# Patient Record
Sex: MALE | HISPANIC OR LATINO | Employment: UNEMPLOYED | URBAN - METROPOLITAN AREA
[De-identification: names, ages, dates, MRNs, and addresses within clinical notes are randomized per-mention and may not be internally consistent; named-entity substitution may affect disease eponyms.]

---

## 2017-08-17 ENCOUNTER — HOSPITAL ENCOUNTER (INPATIENT)
Facility: MEDICAL CENTER | Age: 47
LOS: 7 days | DRG: 871 | End: 2017-08-24
Attending: HOSPITALIST | Admitting: HOSPITALIST
Payer: COMMERCIAL

## 2017-08-17 ENCOUNTER — APPOINTMENT (OUTPATIENT)
Dept: RADIOLOGY | Facility: MEDICAL CENTER | Age: 47
DRG: 871 | End: 2017-08-17
Attending: HOSPITALIST
Payer: COMMERCIAL

## 2017-08-17 ENCOUNTER — RESOLUTE PROFESSIONAL BILLING HOSPITAL PROF FEE (OUTPATIENT)
Dept: HOSPITALIST | Facility: MEDICAL CENTER | Age: 47
End: 2017-08-17
Payer: COMMERCIAL

## 2017-08-17 ENCOUNTER — HOSPITAL ENCOUNTER (OUTPATIENT)
Facility: MEDICAL CENTER | Age: 47
End: 2017-08-17

## 2017-08-17 PROBLEM — K25.9 GASTRIC ULCER: Status: ACTIVE | Noted: 2017-08-17

## 2017-08-17 PROBLEM — B99.9 IMMUNOCOMPROMISED STATUS ASSOCIATED WITH INFECTION (HCC): Status: ACTIVE | Noted: 2017-08-17

## 2017-08-17 PROBLEM — F12.90 MARIJUANA USE, CONTINUOUS: Status: ACTIVE | Noted: 2017-08-17

## 2017-08-17 PROBLEM — A41.9 SEVERE SEPSIS(995.92): Status: ACTIVE | Noted: 2017-08-17

## 2017-08-17 PROBLEM — G89.29 CHRONIC BACK PAIN: Status: ACTIVE | Noted: 2017-08-17

## 2017-08-17 PROBLEM — D84.81 IMMUNOCOMPROMISED STATUS ASSOCIATED WITH INFECTION (HCC): Status: ACTIVE | Noted: 2017-08-17

## 2017-08-17 PROBLEM — Z87.891 HISTORY OF TOBACCO ABUSE: Status: ACTIVE | Noted: 2017-08-17

## 2017-08-17 PROBLEM — J18.9 RIGHT LOWER LOBE PNEUMONIA: Status: ACTIVE | Noted: 2017-08-17

## 2017-08-17 PROBLEM — R65.20 SEVERE SEPSIS(995.92): Status: ACTIVE | Noted: 2017-08-17

## 2017-08-17 PROBLEM — M54.9 CHRONIC BACK PAIN: Status: ACTIVE | Noted: 2017-08-17

## 2017-08-17 PROBLEM — L40.9 PSORIASIS: Status: ACTIVE | Noted: 2017-08-17

## 2017-08-17 PROBLEM — F10.11 HISTORY OF ALCOHOL ABUSE: Status: ACTIVE | Noted: 2017-08-17

## 2017-08-17 LAB
APPEARANCE UR: ABNORMAL
APTT PPP: 29.9 SEC (ref 24.7–36)
BASE EXCESS BLDA CALC-SCNC: -7 MMOL/L (ref -4–3)
BILIRUB UR QL STRIP.AUTO: NEGATIVE
BODY TEMPERATURE: 36.1 CENTIGRADE
COLOR UR: YELLOW
GLUCOSE UR STRIP.AUTO-MCNC: NEGATIVE MG/DL
HCO3 BLDA-SCNC: 15 MMOL/L (ref 17–25)
INHALED O2 FLOW RATE: 4 L/MIN (ref 2–10)
INR PPP: 1.27 (ref 0.87–1.13)
KETONES UR STRIP.AUTO-MCNC: NEGATIVE MG/DL
LACTATE BLD-SCNC: 6.28 MMOL/L (ref 0.5–2)
LEUKOCYTE ESTERASE UR QL STRIP.AUTO: NEGATIVE
MAGNESIUM SERPL-MCNC: 1.5 MG/DL (ref 1.5–2.5)
MICRO URNS: ABNORMAL
NITRITE UR QL STRIP.AUTO: NEGATIVE
PCO2 BLDA: 24.1 MMHG (ref 26–37)
PCO2 TEMP ADJ BLDA: 23.2 MMHG (ref 26–37)
PH BLDA: 7.42 [PH] (ref 7.4–7.5)
PH TEMP ADJ BLDA: 7.43 [PH] (ref 7.4–7.5)
PH UR STRIP.AUTO: 5 [PH]
PHOSPHATE SERPL-MCNC: 3 MG/DL (ref 2.5–4.5)
PO2 BLDA: 66.2 MMHG (ref 64–87)
PO2 TEMP ADJ BLDA: 62.2 MMHG (ref 64–87)
POTASSIUM SERPL-SCNC: 3.3 MMOL/L (ref 3.6–5.5)
PROT UR QL STRIP: 100 MG/DL
PROTHROMBIN TIME: 15.7 SEC (ref 12–14.6)
RBC UR QL AUTO: ABNORMAL
SAO2 % BLDA: 91.9 % (ref 93–99)
SP GR UR STRIP.AUTO: 1.01
SPECIMEN DRAWN FROM PATIENT: ABNORMAL
TROPONIN I SERPL-MCNC: 0.07 NG/ML (ref 0–0.04)
TSH SERPL DL<=0.005 MIU/L-ACNC: 0.48 UIU/ML (ref 0.35–5.5)

## 2017-08-17 PROCEDURE — 36556 INSERT NON-TUNNEL CV CATH: CPT | Performed by: HOSPITALIST

## 2017-08-17 PROCEDURE — 700111 HCHG RX REV CODE 636 W/ 250 OVERRIDE (IP)

## 2017-08-17 PROCEDURE — 700105 HCHG RX REV CODE 258

## 2017-08-17 PROCEDURE — 82533 TOTAL CORTISOL: CPT

## 2017-08-17 PROCEDURE — 84145 PROCALCITONIN (PCT): CPT

## 2017-08-17 PROCEDURE — 84100 ASSAY OF PHOSPHORUS: CPT

## 2017-08-17 PROCEDURE — 87040 BLOOD CULTURE FOR BACTERIA: CPT | Mod: 91

## 2017-08-17 PROCEDURE — 82803 BLOOD GASES ANY COMBINATION: CPT

## 2017-08-17 PROCEDURE — 81001 URINALYSIS AUTO W/SCOPE: CPT

## 2017-08-17 PROCEDURE — 84132 ASSAY OF SERUM POTASSIUM: CPT

## 2017-08-17 PROCEDURE — 85007 BL SMEAR W/DIFF WBC COUNT: CPT

## 2017-08-17 PROCEDURE — 02HV33Z INSERTION OF INFUSION DEVICE INTO SUPERIOR VENA CAVA, PERCUTANEOUS APPROACH: ICD-10-PCS | Performed by: HOSPITALIST

## 2017-08-17 PROCEDURE — 85610 PROTHROMBIN TIME: CPT

## 2017-08-17 PROCEDURE — 83605 ASSAY OF LACTIC ACID: CPT

## 2017-08-17 PROCEDURE — 84484 ASSAY OF TROPONIN QUANT: CPT

## 2017-08-17 PROCEDURE — 84443 ASSAY THYROID STIM HORMONE: CPT

## 2017-08-17 PROCEDURE — 87086 URINE CULTURE/COLONY COUNT: CPT

## 2017-08-17 PROCEDURE — 770022 HCHG ROOM/CARE - ICU (200)

## 2017-08-17 PROCEDURE — 85730 THROMBOPLASTIN TIME PARTIAL: CPT

## 2017-08-17 PROCEDURE — 700105 HCHG RX REV CODE 258: Performed by: HOSPITALIST

## 2017-08-17 PROCEDURE — 80053 COMPREHEN METABOLIC PANEL: CPT

## 2017-08-17 PROCEDURE — 700101 HCHG RX REV CODE 250: Performed by: HOSPITALIST

## 2017-08-17 PROCEDURE — 99291 CRITICAL CARE FIRST HOUR: CPT | Performed by: HOSPITALIST

## 2017-08-17 PROCEDURE — 36600 WITHDRAWAL OF ARTERIAL BLOOD: CPT

## 2017-08-17 PROCEDURE — 71010 DX-CHEST-PORTABLE (1 VIEW): CPT

## 2017-08-17 PROCEDURE — 83735 ASSAY OF MAGNESIUM: CPT

## 2017-08-17 PROCEDURE — 85027 COMPLETE CBC AUTOMATED: CPT

## 2017-08-17 PROCEDURE — B548ZZA ULTRASONOGRAPHY OF SUPERIOR VENA CAVA, GUIDANCE: ICD-10-PCS | Performed by: HOSPITALIST

## 2017-08-17 RX ORDER — BISACODYL 10 MG
10 SUPPOSITORY, RECTAL RECTAL
Status: DISCONTINUED | OUTPATIENT
Start: 2017-08-17 | End: 2017-08-24 | Stop reason: HOSPADM

## 2017-08-17 RX ORDER — ONDANSETRON 4 MG/1
4 TABLET, ORALLY DISINTEGRATING ORAL EVERY 4 HOURS PRN
Status: DISCONTINUED | OUTPATIENT
Start: 2017-08-17 | End: 2017-08-24 | Stop reason: HOSPADM

## 2017-08-17 RX ORDER — SODIUM CHLORIDE 9 MG/ML
1000 INJECTION, SOLUTION INTRAVENOUS
Status: COMPLETED | OUTPATIENT
Start: 2017-08-17 | End: 2017-08-20

## 2017-08-17 RX ORDER — SODIUM CHLORIDE 9 MG/ML
30 INJECTION, SOLUTION INTRAVENOUS
Status: DISCONTINUED | OUTPATIENT
Start: 2017-08-17 | End: 2017-08-24 | Stop reason: HOSPADM

## 2017-08-17 RX ORDER — SODIUM CHLORIDE 9 MG/ML
INJECTION, SOLUTION INTRAVENOUS CONTINUOUS
Status: DISCONTINUED | OUTPATIENT
Start: 2017-08-17 | End: 2017-08-22

## 2017-08-17 RX ORDER — FOLIC ACID 1 MG/1
1 TABLET ORAL DAILY
Status: DISCONTINUED | OUTPATIENT
Start: 2017-08-18 | End: 2017-08-24 | Stop reason: HOSPADM

## 2017-08-17 RX ORDER — OMEPRAZOLE 20 MG/1
20 CAPSULE, DELAYED RELEASE ORAL DAILY
Status: DISCONTINUED | OUTPATIENT
Start: 2017-08-18 | End: 2017-08-19

## 2017-08-17 RX ORDER — ACETAMINOPHEN 325 MG/1
650 TABLET ORAL EVERY 6 HOURS PRN
Status: DISCONTINUED | OUTPATIENT
Start: 2017-08-17 | End: 2017-08-24 | Stop reason: HOSPADM

## 2017-08-17 RX ORDER — HALOPERIDOL 5 MG/ML
5 INJECTION INTRAMUSCULAR EVERY 4 HOURS PRN
Status: DISCONTINUED | OUTPATIENT
Start: 2017-08-17 | End: 2017-08-24 | Stop reason: HOSPADM

## 2017-08-17 RX ORDER — VASOPRESSIN 20 U/ML
INJECTION PARENTERAL
Status: COMPLETED
Start: 2017-08-17 | End: 2017-08-17

## 2017-08-17 RX ORDER — OMEPRAZOLE 20 MG/1
20 CAPSULE, DELAYED RELEASE ORAL DAILY
COMMUNITY

## 2017-08-17 RX ORDER — POTASSIUM CHLORIDE 14.9 MG/ML
20 INJECTION INTRAVENOUS ONCE
Status: COMPLETED | OUTPATIENT
Start: 2017-08-18 | End: 2017-08-18

## 2017-08-17 RX ORDER — ZOLPIDEM TARTRATE 5 MG/1
5 TABLET ORAL NIGHTLY PRN
COMMUNITY

## 2017-08-17 RX ORDER — FOLIC ACID 1 MG/1
1 TABLET ORAL DAILY
COMMUNITY

## 2017-08-17 RX ORDER — NAPROXEN 500 MG/1
500 TABLET ORAL 2 TIMES DAILY WITH MEALS
COMMUNITY

## 2017-08-17 RX ORDER — HYDROCODONE BITARTRATE AND ACETAMINOPHEN 10; 325 MG/1; MG/1
1-2 TABLET ORAL EVERY 6 HOURS PRN
Status: DISCONTINUED | OUTPATIENT
Start: 2017-08-17 | End: 2017-08-24 | Stop reason: HOSPADM

## 2017-08-17 RX ORDER — PROMETHAZINE HYDROCHLORIDE 25 MG/1
12.5-25 SUPPOSITORY RECTAL EVERY 4 HOURS PRN
Status: DISCONTINUED | OUTPATIENT
Start: 2017-08-17 | End: 2017-08-24 | Stop reason: HOSPADM

## 2017-08-17 RX ORDER — CEFTRIAXONE 1 G/1
1 INJECTION, POWDER, FOR SOLUTION INTRAMUSCULAR; INTRAVENOUS
Status: DISCONTINUED | OUTPATIENT
Start: 2017-08-17 | End: 2017-08-17

## 2017-08-17 RX ORDER — POLYETHYLENE GLYCOL 3350 17 G/17G
1 POWDER, FOR SOLUTION ORAL
Status: DISCONTINUED | OUTPATIENT
Start: 2017-08-17 | End: 2017-08-24 | Stop reason: HOSPADM

## 2017-08-17 RX ORDER — SODIUM CHLORIDE 9 MG/ML
2000 INJECTION, SOLUTION INTRAVENOUS ONCE
Status: COMPLETED | OUTPATIENT
Start: 2017-08-17 | End: 2017-08-17

## 2017-08-17 RX ORDER — MORPHINE SULFATE 15 MG/1
30 TABLET, FILM COATED, EXTENDED RELEASE ORAL EVERY 12 HOURS
COMMUNITY

## 2017-08-17 RX ORDER — PROMETHAZINE HYDROCHLORIDE 25 MG/1
12.5-25 TABLET ORAL EVERY 4 HOURS PRN
Status: DISCONTINUED | OUTPATIENT
Start: 2017-08-17 | End: 2017-08-24 | Stop reason: HOSPADM

## 2017-08-17 RX ORDER — HYDROCODONE BITARTRATE AND ACETAMINOPHEN 10; 325 MG/1; MG/1
1-2 TABLET ORAL EVERY 6 HOURS PRN
COMMUNITY

## 2017-08-17 RX ORDER — ONDANSETRON 2 MG/ML
4 INJECTION INTRAMUSCULAR; INTRAVENOUS EVERY 4 HOURS PRN
Status: DISCONTINUED | OUTPATIENT
Start: 2017-08-17 | End: 2017-08-24 | Stop reason: HOSPADM

## 2017-08-17 RX ORDER — AMOXICILLIN 250 MG
2 CAPSULE ORAL 2 TIMES DAILY
Status: DISCONTINUED | OUTPATIENT
Start: 2017-08-17 | End: 2017-08-24 | Stop reason: HOSPADM

## 2017-08-17 RX ORDER — MAGNESIUM SULFATE 1 G/100ML
1 INJECTION INTRAVENOUS ONCE
Status: COMPLETED | OUTPATIENT
Start: 2017-08-18 | End: 2017-08-18

## 2017-08-17 RX ADMIN — VASOPRESSIN 20 UNITS: 20 INJECTION INTRAVENOUS at 21:26

## 2017-08-17 RX ADMIN — CEFTRIAXONE 2 G: 1 INJECTION, POWDER, FOR SOLUTION INTRAMUSCULAR; INTRAVENOUS at 22:40

## 2017-08-17 RX ADMIN — SODIUM CHLORIDE: 9 INJECTION, SOLUTION INTRAVENOUS at 21:50

## 2017-08-17 RX ADMIN — NOREPINEPHRINE BITARTRATE 20 MCG/MIN: 1 INJECTION, SOLUTION, CONCENTRATE INTRAVENOUS at 21:03

## 2017-08-17 RX ADMIN — VANCOMYCIN HYDROCHLORIDE 1300 MG: 10 INJECTION, POWDER, LYOPHILIZED, FOR SOLUTION INTRAVENOUS at 23:15

## 2017-08-17 RX ADMIN — SODIUM CHLORIDE 2000 ML: 9 INJECTION, SOLUTION INTRAVENOUS at 22:21

## 2017-08-17 RX ADMIN — SODIUM CHLORIDE: 9 INJECTION, SOLUTION INTRAVENOUS at 23:36

## 2017-08-17 ASSESSMENT — PAIN SCALES - GENERAL: PAINLEVEL_OUTOF10: 6

## 2017-08-17 ASSESSMENT — LIFESTYLE VARIABLES: EVER_SMOKED: YES

## 2017-08-17 NOTE — IP AVS SNAPSHOT
8/24/2017    Layo Fiscel  [address]  [facility] [address]    Dear Layo:    [facility] wants to ensure your discharge home is safe and you or your loved ones have had all of your questions answered regarding your care after you leave the hospital.    Below is a list of resources and contact information should you have any questions regarding your hospital stay, follow-up instructions, or active medical symptoms.    Questions or Concerns Regarding… Contact   Medical Questions Related to Your Discharge  (7 days a week, 8am-5pm) Contact a Nurse Care Coordinator   [phone]   Medical Questions Not Related to Your Discharge  (24 hours a day / 7 days a week)  Contact the Nurse Health Line   [phone]    Medications or Discharge Instructions Refer to your discharge packet   or contact your [facility] Primary Care Provider   [phone]   Follow-up Appointment(s) Schedule your appointment via [employer]   or contact Scheduling [phone]   Billing Review your statement via [employer]  or contact Billing [phone]   Medical Records Review your records via [employer]   or contact Medical Records [phone]     You may receive a telephone call within two days of discharge. This call is to make certain you understand your discharge instructions and have the opportunity to have any questions answered. You can also easily access your medical information, test results and upcoming appointments via the [employer]Force free online health management tool. You can learn more and sign up at [employer]. For assistance setting up your MagForce account, please call [phone].    Once again, we want to ensure your discharge home is safe and that you have a clear understanding of any next steps in your care. If you have any questions or concerns, please do not hesitate to contact us, we are here for you. Thank you for choosing [facility] for your healthcare needs.    Sincerely,    Your [facility] Healthcare Team

## 2017-08-17 NOTE — IP AVS SNAPSHOT
" <p align=\"LEFT\"><IMG SRC=\"[email or web address]\" alt=\"Image\" WIDTH=\"50%\" HEIGHT=\"200\" BORDER=\"\"></p>                   Name:Layo Garcia  Medical Record Number:[id number]  CSN: [id number]    YOB: 1970   Age: 46 y.o.  Sex: male  HT:1.727 m (5' 8\") WT: 98 kg (216 lb 0.8 oz)          Admit Date: 8/17/2017     Discharge Date:   Today's Date: 8/24/2017  Attending Doctor:  Perla Lainez M.D.                  Allergies:  Wellbutrin and Cortisone             Medication List      Take these Medications        Instructions    amoxicillin-clavulanate 875-125 MG Tabs   Commonly known as:  AUGMENTIN    Take 1 Tab by mouth every 12 hours for 10 days.   Dose:  1 Tab       fluconazole 200 MG Tabs   Commonly known as:  DIFLUCAN    Take 1 Tab by mouth every day for 5 days.   Dose:  200 mg       folic acid 1 MG Tabs   Commonly known as:  FOLVITE    Take 1 mg by mouth every day.   Dose:  1 mg       hydrocodone/acetaminophen  MG Tabs   Commonly known as:  NORCO    Take 1-2 Tabs by mouth every 6 hours as needed for Moderate Pain.   Dose:  1-2 Tab       methotrexate 2.5 MG Tabs    Take 15 mg by mouth every 7 days.   Dose:  15 mg       morphine ER 15 MG Tbcr tablet   Commonly known as:  MS CONTIN    Take 30 mg by mouth every 12 hours.   Dose:  30 mg       naproxen 500 MG Tabs   Commonly known as:  NAPROSYN    Take 500 mg by mouth 2 times a day, with meals.   Dose:  500 mg       omeprazole 20 MG delayed-release capsule   Commonly known as:  PRILOSEC    Take 20 mg by mouth every day.   Dose:  20 mg       zolpidem 5 MG Tabs   Commonly known as:  AMBIEN    Take 5 mg by mouth at bedtime as needed for Sleep.   Dose:  5 mg         "

## 2017-08-17 NOTE — IP AVS SNAPSHOT
" Home Care Instructions                                                                                                                  Name:Layo Garcia  Medical Record Number:[id number]  CSN: [id number]    YOB: 1970   Age: 46 y.o.  Sex: male  HT:1.727 m (5' 8\") WT: 98 kg (216 lb 0.8 oz)          Admit Date: 8/17/2017     Discharge Date:   Today's Date: 8/24/2017  Attending Doctor:  Perla Lainez M.D.                  Allergies:  Wellbutrin and Cortisone            Discharge Instructions       Discharge Instructions    Discharged to home by car with relative. Discharged via wheelchair, hospital escort: Yes.  Special equipment needed: Not Applicable    Be sure to schedule a follow-up appointment with your primary care doctor or any specialists as instructed.     Discharge Plan:        I understand that a diet low in cholesterol, fat, and sodium is recommended for good health. Unless I have been given specific instructions below for another diet, I accept this instruction as my diet prescription.   Other diet: as tolerated.    Special Instructions: Follow up with PCP.    · Is patient discharged on Warfarin / Coumadin?   No     · Is patient Post Blood Transfusion?  No    Depression / Suicide Risk    As you are discharged from this [facility] facility, it is important to learn how to keep safe from harming yourself.    Recognize the warning signs:  · Abrupt changes in personality, positive or negative- including increase in energy   · Giving away possessions  · Change in eating patterns- significant weight changes-  positive or negative  · Change in sleeping patterns- unable to sleep or sleeping all the time   · Unwillingness or inability to communicate  · Depression  · Unusual sadness, discouragement and loneliness  · Talk of wanting to die  · Neglect of personal appearance   · Rebelliousness- reckless behavior  · Withdrawal from people/activities they love  · Confusion- inability to concentrate     If " you or a loved one observes any of these behaviors or has concerns about self-harm, here's what you can do:  · Talk about it- your feelings and reasons for harming yourself  · Remove any means that you might use to hurt yourself (examples: pills, rope, extension cords, firearm)  · Get professional help from the community (Mental Health, Substance Abuse, psychological counseling)  · Do not be alone:Call your Safe Contact- someone whom you trust who will be there for you.  · Call your local CRISIS HOTLINE 896-1813 or 700-712-1379  · Call your local Children's Mobile Crisis Response Team [place] (210) 554-5205 or www.PlayWith  · Call the toll free National Suicide Prevention Hotlines   · National Suicide Prevention Lifeline 755-636-HUVN (3335)  · National [employer] Line Network 800-SUICIDE (977-2583)      Sepsis, Adult  Sepsis is a serious infection of your blood or tissues that affects your whole body. The infection that causes sepsis may be bacterial, viral, fungal, or parasitic. Sepsis may be life threatening. Sepsis can cause your blood pressure to drop. This may result in shock. Shock causes your central nervous system and your organs to stop working correctly.   RISK FACTORS  Sepsis can happen in anyone, but it is more likely to happen in people who have weakened immune systems.  SIGNS AND SYMPTOMS   Symptoms of sepsis can include:  · Fever or low body temperature (hypothermia).  · Rapid breathing (hyperventilation).  · Chills.  · Rapid heartbeat (tachycardia).  · Confusion or light-headedness.  · Trouble breathing.  · Urinating much less than usual.  · Cool, clammy skin or red, flushed skin.  · Other problems with the heart, kidneys, or brain.  DIAGNOSIS   Your health care provider will likely do tests to look for an infection, to see if the infection has spread to your blood, and to see how serious your condition is. Tests can include:  · Blood tests, including cultures of your blood.  · Cultures of  other fluids from your body, such as:  ¨ Urine.  ¨ Pus from wounds.  ¨ Mucus coughed up from your lungs.  · Urine tests other than cultures.  · X-ray exams or other imaging tests.  TREATMENT   Treatment will begin with elimination of the source of infection. If your sepsis is likely caused by a bacterial or fungal infection, you will be given antibiotic or antifungal medicines.  You may also receive:  · Oxygen.  · Fluids through an IV tube.  · Medicines to increase your blood pressure.  · A machine to clean your blood (dialysis) if your kidneys fail.  · A machine to help you breathe if your lungs fail.  SEEK IMMEDIATE MEDICAL CARE IF:  You get an infection or develop any of the signs and symptoms of sepsis after surgery or a hospitalization.     This information is not intended to replace advice given to you by your health care provider. Make sure you discuss any questions you have with your health care provider.     Document Released: 09/15/2004 Document Revised: 05/03/2016 Document Reviewed: 08/25/2014  [employer] Interactive Patient Education ©2016 Elsevier Inc.    Amoxicillin; Clavulanic Acid tablets  What is this medicine?  AMOXICILLIN; CLAVULANIC ACID (a mox i GERTRUDE in; SANDRO mosley ic AS id) is a penicillin antibiotic. It is used to treat certain kinds of bacterial infections. It will not work for colds, flu, or other viral infections.  This medicine may be used for other purposes; ask your health care provider or pharmacist if you have questions.  COMMON BRAND NAME(S): Augmentin  What should I tell my health care provider before I take this medicine?  They need to know if you have any of these conditions:  -bowel disease, like colitis  -kidney disease  -liver disease  -mononucleosis  -an unusual or allergic reaction to amoxicillin, penicillin, cephalosporin, other antibiotics, clavulanic acid, other medicines, foods, dyes, or preservatives  -pregnant or trying to get pregnant  -breast-feeding  How should I use  this medicine?  Take this medicine by mouth with a full glass of water. Follow the directions on the prescription label. Take at the start of a meal. Do not crush or chew. If the tablet has a score line, you may cut it in half at the score line for easier swallowing. Take your medicine at regular intervals. Do not take your medicine more often than directed. Take all of your medicine as directed even if you think you are better. Do not skip doses or stop your medicine early.  Talk to your pediatrician regarding the use of this medicine in children. Special care may be needed.  Overdosage: If you think you have taken too much of this medicine contact a poison control center or emergency room at once.  NOTE: This medicine is only for you. Do not share this medicine with others.  What if I miss a dose?  If you miss a dose, take it as soon as you can. If it is almost time for your next dose, take only that dose. Do not take double or extra doses.  What may interact with this medicine?  -allopurinol  -anticoagulants  -birth control pills  -methotrexate  -probenecid  This list may not describe all possible interactions. Give your health care provider a list of all the medicines, herbs, non-prescription drugs, or dietary supplements you use. Also tell them if you smoke, drink alcohol, or use illegal drugs. Some items may interact with your medicine.  What should I watch for while using this medicine?  Tell your doctor or health care professional if your symptoms do not improve.  Do not treat diarrhea with over the counter products. Contact your doctor if you have diarrhea that lasts more than 2 days or if it is severe and watery.  If you have diabetes, you may get a false-positive result for sugar in your urine. Check with your doctor or health care professional.  Birth control pills may not work properly while you are taking this medicine. Talk to your doctor about using an extra method of birth control.  What side  effects may I notice from receiving this medicine?  Side effects that you should report to your doctor or health care professional as soon as possible:  -allergic reactions like skin rash, itching or hives, swelling of the face, lips, or tongue  -breathing problems  -dark urine  -fever or chills, sore throat  -redness, blistering, peeling or loosening of the skin, including inside the mouth  -seizures  -trouble passing urine or change in the amount of urine  -unusual bleeding, bruising  -unusually weak or tired  -white patches or sores in the mouth or throat  Side effects that usually do not require medical attention (report to your doctor or health care professional if they continue or are bothersome):  -diarrhea  -dizziness  -headache  -nausea, vomiting  -stomach upset  -vaginal or anal irritation  This list may not describe all possible side effects. Call your doctor for medical advice about side effects. You may report side effects to FDA at 0-273-FDA-5291.  Where should I keep my medicine?  Keep out of the reach of children.  Store at room temperature below 25 degrees C (77 degrees F). Keep container tightly closed. Throw away any unused medicine after the expiration date.  NOTE: This sheet is a summary. It may not cover all possible information. If you have questions about this medicine, talk to your doctor, pharmacist, or health care provider.  © 2014, Elsevier/Gold Standard. (3/12/2009 12:04:30 PM)    Fluconazole tablets  What is this medicine?  FLUCONAZOLE (floo SHIRAZ na zole) is an antifungal medicine. It is used to treat certain kinds of fungal or yeast infections.  This medicine may be used for other purposes; ask your health care provider or pharmacist if you have questions.  COMMON BRAND NAME(S): Diflucan  What should I tell my health care provider before I take this medicine?  They need to know if you have any of these conditions:  -electrolyte abnormalities  -history of irregular heart beat  -kidney  disease  -an unusual or allergic reaction to fluconazole, other azole antifungals, medicines, foods, dyes, or preservatives  -pregnant or trying to get pregnant  -breast-feeding  How should I use this medicine?  Take this medicine by mouth. Follow the directions on the prescription label. Do not take your medicine more often than directed.  Talk to your pediatrician regarding the use of this medicine in children. Special care may be needed. This medicine has been used in children as young as 6 months of age.  Overdosage: If you think you have taken too much of this medicine contact a poison control center or emergency room at once.  NOTE: This medicine is only for you. Do not share this medicine with others.  What if I miss a dose?  If you miss a dose, take it as soon as you can. If it is almost time for your next dose, take only that dose. Do not take double or extra doses.  What may interact with this medicine?  Do not take this medicine with any of the following medications:  -cisapride  -pimozide  -red yeast rice  This medicine may also interact with the following medications:  -birth control pills  -cyclosporine  -diuretics like hydrochlorothiazide  -medicines for diabetes that are taken by mouth  -medicines for high cholesterol like atorvastatin, lovastatin or simvastatin  -phenytoin  -ramelteon  -rifabutin  -rifampin  -some medicines for anxiety or sleep  -tacrolimus  -terfenadine  -theophylline  -tofacitinib  -warfarin  This list may not describe all possible interactions. Give your health care provider a list of all the medicines, herbs, non-prescription drugs, or dietary supplements you use. Also tell them if you smoke, drink alcohol, or use illegal drugs. Some items may interact with your medicine.  What should I watch for while using this medicine?  Visit your doctor or health care professional for regular checkups. If you are taking this medicine for a long time you may need blood work. Tell your doctor  if your symptoms do not improve. Some fungal infections need many weeks or months of treatment to cure.  Alcohol can increase possible damage to your liver. Avoid alcoholic drinks.  If you have a vaginal infection, do not have sex until you have finished your treatment. You can wear a sanitary napkin. Do not use tampons. Wear freshly washed cotton, not synthetic, panties.  What side effects may I notice from receiving this medicine?  Side effects that you should report to your doctor or health care professional as soon as possible:  -allergic reactions like skin rash or itching, hives, swelling of the lips, mouth, tongue, or throat  -dark urine  -feeling dizzy or faint  -irregular heartbeat or chest pain  -redness, blistering, peeling or loosening of the skin, including inside the mouth  -trouble breathing  -unusual bruising or bleeding  -vomiting  -yellowing of the eyes or skin  Side effects that usually do not require medical attention (report to your doctor or health care professional if they continue or are bothersome):  -changes in how food tastes  -diarrhea  -headache  -stomach upset or nausea  This list may not describe all possible side effects. Call your doctor for medical advice about side effects. You may report side effects to FDA at 7-719-FDA-0805.  Where should I keep my medicine?  Keep out of the reach of children.  Store at room temperature below 30 degrees C (86 degrees F). Throw away any medicine after the expiration date.  NOTE: This sheet is a summary. It may not cover all possible information. If you have questions about this medicine, talk to your doctor, pharmacist, or health care provider.  © 2014, Elsevier/Gold Standard. (9/17/2013 3:15:11 PM)         Discharge Medication Instructions:    Below are the medications your physician expects you to take upon discharge:    Review all your home medications and newly ordered medications with your doctor and/or pharmacist. Follow medication  instructions as directed by your doctor and/or pharmacist.    Please keep your medication list with you and share with your physician.               Medication List      START taking these medications        Instructions    Morning Afternoon Evening Bedtime    amoxicillin-clavulanate 875-125 MG Tabs   Last time this was given:  1 Tab on 8/24/2017  8:58 AM   Commonly known as:  AUGMENTIN        Take 1 Tab by mouth every 12 hours for 10 days.   Dose:  1 Tab                        fluconazole 200 MG Tabs   Last time this was given:  200 mg on 8/24/2017  8:58 AM   Commonly known as:  DIFLUCAN        Take 1 Tab by mouth every day for 5 days.   Dose:  200 mg                          CONTINUE taking these medications        Instructions    Morning Afternoon Evening Bedtime    folic acid 1 MG Tabs   Last time this was given:  1 mg on 8/24/2017  9:00 AM   Commonly known as:  FOLVITE        Take 1 mg by mouth every day.   Dose:  1 mg                        hydrocodone/acetaminophen  MG Tabs   Last time this was given:  2 Tabs on 8/24/2017 12:59 AM   Commonly known as:  NORCO        Take 1-2 Tabs by mouth every 6 hours as needed for Moderate Pain.   Dose:  1-2 Tab                        methotrexate 2.5 MG Tabs        Take 15 mg by mouth every 7 days.   Dose:  15 mg                        morphine ER 15 MG Tbcr tablet   Last time this was given:  30 mg on 8/24/2017  8:58 AM   Commonly known as:  MS CONTIN        Take 30 mg by mouth every 12 hours.   Dose:  30 mg                        naproxen 500 MG Tabs   Commonly known as:  NAPROSYN        Take 500 mg by mouth 2 times a day, with meals.   Dose:  500 mg                        omeprazole 20 MG delayed-release capsule   Last time this was given:  20 mg on 8/24/2017  8:57 AM   Commonly known as:  PRILOSEC        Take 20 mg by mouth every day.   Dose:  20 mg                        zolpidem 5 MG Tabs   Commonly known as:  AMBIEN        Take 5 mg by mouth at bedtime as  needed for Sleep.   Dose:  5 mg                             Where to Get Your Medications      You can get these medications from any pharmacy     Bring a paper prescription for each of these medications    - amoxicillin-clavulanate 875-125 MG Tabs  - fluconazole 200 MG Tabs            Instructions           Diet / Nutrition:    Follow any diet instructions given to you by your doctor or the dietician, including how much salt (sodium) you are allowed each day.    If you are overweight, talk to your doctor about a weight reduction plan.    Activity:    Remain physically active following your doctor's instructions about exercise and activity.    Rest often.     Any time you become even a little tired or short of breath, SIT DOWN and rest.    Worsening Symptoms:    Report any of the following signs and symptoms to the doctor's office immediately:    *Pain of jaw, arm, or neck  *Chest pain not relieved by medication                               *Dizziness or loss of consciousness  *Difficulty breathing even when at rest   *More tired than usual                                       *Bleeding drainage or swelling of surgical site  *Swelling of feet, ankles, legs or stomach                 *Fever (>100ºF)  *Pink or blood tinged sputum  *Weight gain (3lbs/day or 5lbs /week)           *Shock from internal defibrillator (if applicable)  *Palpitations or irregular heartbeats                *Cool and/or numb extremities    Stroke Awareness    Common Risk Factors for Stroke include:    Age  Atrial Fibrillation  Carotid Artery Stenosis  Diabetes Mellitus  Excessive alcohol consumption  High blood pressure  Overweight   Physical inactivity  Smoking    Warning signs and symptoms of a stroke include:    *Sudden numbness or weakness of the face, arm or leg (especially on one side of the body).  *Sudden confusion, trouble speaking or understanding.  *Sudden trouble seeing in one or both eyes.  *Sudden trouble walking, dizziness,  loss of balance or coordination.Sudden severe headache with no known cause.    It is very important to get treatment quickly when a stroke occurs. If you experience any of the above warning signs, call 911 immediately.                   Disclaimer         Quit Smoking / Tobacco Use:    I understand the use of any tobacco products increases my chance of suffering from future heart disease or stroke and could cause other illnesses which may shorten my life. Quitting the use of tobacco products is the single most important thing I can do to improve my health. For further information on smoking / tobacco cessation call a Toll Free Quit Line at 1-764.827.7891 (*National [employer] [place]) or 1-427.273.8348 (American Lung Association) or you can access the web based program at www.lungVoipSwitch.org.    [place] Tobacco Users Help Line:  (188) 516-2217       Toll Free: 1-295.219.2765  Quit Tobacco Program [facility] (426)417-7928    Crisis Hotline:    [place] Crisis Hotline:  9-119-NNQAALK or 1-489.949.6862    [place] Crisis Hotline:    1-481.344.4414 or 316-023-2925    Discharge Survey:   Thank you for choosing [facility]. We hope we did everything we could to make your hospital stay a pleasant one. You may be receiving a phone survey and we would appreciate your time and participation in answering the questions. Your input is very valuable to us in our efforts to improve our service to our patients and their families.        My signature on this form indicates that:    1. I have reviewed and understand the above information.  2. My questions regarding this information have been answered to my satisfaction.  3. I have formulated a plan with my discharge nurse to obtain my prescribed medications for home.                  Disclaimer         __________________________________                     __________       ________                       Patient Signature                                                  Date                    Time

## 2017-08-17 NOTE — IP AVS SNAPSHOT
CTSpace Access Code: [email or web address]  Expires: 9/23/2017 11:22 AM    Your email address is not on file at [facility].  Email Addresses are required for you to sign up for CTSpace, please contact [phone] to verify your personal information and to provide your email address prior to attempting to register for CTSpace.    Layo Garcia   [id number]  [place] [address]    CTSpace  A secure, online tool to manage your health information     [facility]’s CTSpace® is a secure, online tool that connects you to your personalized health information from the privacy of your home -- day or night - making it very easy for you to manage your healthcare. Once the activation process is completed, you can even access your medical information using the CTSpace baltazar, which is available for free in the Apple Baltazar store or Google Play store.     To learn more about CTSpace, visit [email or web address]    There are two levels of access available (as shown below):   My Chart Features  [facility] Primary Care Doctor [facility]  Specialists [facility]  Urgent  Care Non-[facility] Primary Care Doctor   Email your healthcare team securely and privately 24/7 X X X    Manage appointments: schedule your next appointment; view details of past/upcoming appointments X      Request prescription refills. X      View recent personal medical records, including lab and immunizations X X X X   View health record, including health history, allergies, medications X X X X   Read reports about your outpatient visits, procedures, consult and ER notes X X X X   See your discharge summary, which is a recap of your hospital and/or ER visit that includes your diagnosis, lab results, and care plan X X  X     How to register for [employer]:  Once your e-mail address has been verified, follow the following steps to sign up for CTSpace.     1. Go to  [email or web address]  2. Click on the Sign Up Now box, which takes you to the New Member Sign Up page. You will need to  provide the following information:  a. Enter your [employer] Access Code exactly as it appears at the top of this page. (You will not need to use this code after you’ve completed the sign-up process. If you do not sign up before the expiration date, you must request a new code.)   b. Enter your date of birth.   c. Enter your home email address.   d. Click Submit, and follow the next screen’s instructions.  3. Create a [employer]ics ID. This will be your [employer] login ID and cannot be changed, so think of one that is secure and easy to remember.  4. Create a [employer] password. You can change your password at any time.  5. Enter your Password Reset Question and Answer. This can be used at a later time if you forget your password.   6. Enter your e-mail address. This allows you to receive e-mail notifications when new information is available in [employer].  7. Click Sign Up. You can now view your health information.    For assistance activating your [employer] account, call [phone]

## 2017-08-18 ENCOUNTER — APPOINTMENT (OUTPATIENT)
Dept: RADIOLOGY | Facility: MEDICAL CENTER | Age: 47
DRG: 871 | End: 2017-08-18
Attending: HOSPITALIST
Payer: COMMERCIAL

## 2017-08-18 ENCOUNTER — APPOINTMENT (OUTPATIENT)
Dept: RADIOLOGY | Facility: MEDICAL CENTER | Age: 47
DRG: 871 | End: 2017-08-18
Attending: INTERNAL MEDICINE
Payer: COMMERCIAL

## 2017-08-18 PROBLEM — R65.21 SEPTIC SHOCK(785.52): Status: ACTIVE | Noted: 2017-08-17

## 2017-08-18 PROBLEM — R79.89 ELEVATED TROPONIN LEVEL: Status: ACTIVE | Noted: 2017-08-18

## 2017-08-18 PROBLEM — N17.9 ACUTE RENAL FAILURE (ARF) (HCC): Status: ACTIVE | Noted: 2017-08-18

## 2017-08-18 PROBLEM — E87.6 HYPOKALEMIA: Status: ACTIVE | Noted: 2017-08-18

## 2017-08-18 PROBLEM — E87.1 HYPONATREMIA: Status: ACTIVE | Noted: 2017-08-18

## 2017-08-18 PROBLEM — J96.01 ACUTE RESPIRATORY FAILURE WITH HYPOXIA (HCC): Status: ACTIVE | Noted: 2017-08-18

## 2017-08-18 LAB
ALBUMIN SERPL BCP-MCNC: 2.9 G/DL (ref 3.2–4.9)
ALBUMIN/GLOB SERPL: 0.7 G/DL
ALP SERPL-CCNC: 78 U/L (ref 30–99)
ALT SERPL-CCNC: 22 U/L (ref 2–50)
ANION GAP SERPL CALC-SCNC: 15 MMOL/L (ref 0–11.9)
ANION GAP SERPL CALC-SCNC: 16 MMOL/L (ref 0–11.9)
AST SERPL-CCNC: 45 U/L (ref 12–45)
BASOPHILS # BLD AUTO: 0 % (ref 0–1.8)
BASOPHILS # BLD: 0 K/UL (ref 0–0.12)
BILIRUB SERPL-MCNC: 1.6 MG/DL (ref 0.1–1.5)
BUN SERPL-MCNC: 22 MG/DL (ref 8–22)
BUN SERPL-MCNC: 27 MG/DL (ref 8–22)
C DIFF DNA SPEC QL NAA+PROBE: NEGATIVE
C DIFF TOX GENS STL QL NAA+PROBE: NEGATIVE
CALCIUM SERPL-MCNC: 7.4 MG/DL (ref 8.4–10.2)
CALCIUM SERPL-MCNC: 7.4 MG/DL (ref 8.4–10.2)
CASTS 1761B: ABNORMAL /LPF
CASTS URNS QL MICRO: ABNORMAL /LPF
CHLORIDE SERPL-SCNC: 104 MMOL/L (ref 96–112)
CHLORIDE SERPL-SCNC: 111 MMOL/L (ref 96–112)
CO2 SERPL-SCNC: 13 MMOL/L (ref 20–33)
CO2 SERPL-SCNC: 14 MMOL/L (ref 20–33)
CORTIS SERPL-MCNC: 18.7 UG/DL (ref 0–23)
CREAT SERPL-MCNC: 2.05 MG/DL (ref 0.5–1.4)
CREAT SERPL-MCNC: 2.45 MG/DL (ref 0.5–1.4)
DOHLE BOD BLD QL SMEAR: NORMAL
EKG IMPRESSION: NORMAL
EOSINOPHIL # BLD AUTO: 0 K/UL (ref 0–0.51)
EOSINOPHIL NFR BLD: 0 % (ref 0–6.9)
ERYTHROCYTE [DISTWIDTH] IN BLOOD BY AUTOMATED COUNT: 47.7 FL (ref 35.9–50)
ERYTHROCYTE [DISTWIDTH] IN BLOOD BY AUTOMATED COUNT: 49.1 FL (ref 35.9–50)
GFR SERPL CREATININE-BSD FRML MDRD: 28 ML/MIN/1.73 M 2
GFR SERPL CREATININE-BSD FRML MDRD: 35 ML/MIN/1.73 M 2
GLOBULIN SER CALC-MCNC: 4.3 G/DL (ref 1.9–3.5)
GLUCOSE SERPL-MCNC: 111 MG/DL (ref 65–99)
GLUCOSE SERPL-MCNC: 96 MG/DL (ref 65–99)
HCT VFR BLD AUTO: 41.2 % (ref 42–52)
HCT VFR BLD AUTO: 45 % (ref 42–52)
HGB BLD-MCNC: 13.7 G/DL (ref 14–18)
HGB BLD-MCNC: 15.1 G/DL (ref 14–18)
LACTATE BLD-SCNC: 2.93 MMOL/L (ref 0.5–2)
LACTATE BLD-SCNC: 3.37 MMOL/L (ref 0.5–2)
LACTATE BLD-SCNC: 6.32 MMOL/L (ref 0.5–2)
LACTATE BLD-SCNC: 7.6 MMOL/L (ref 0.5–2)
LACTATE BLD-SCNC: 7.91 MMOL/L (ref 0.5–2)
LV EJECT FRACT  99904: 45
LV EJECT FRACT MOD 2C 99903: 55.13
LV EJECT FRACT MOD 4C 99902: 42.68
LV EJECT FRACT MOD BP 99901: 46.82
LYMPHOCYTES # BLD AUTO: 3.77 K/UL (ref 1–4.8)
LYMPHOCYTES NFR BLD: 10 % (ref 22–41)
MANUAL DIFF BLD: ABNORMAL
MCH RBC QN AUTO: 30.2 PG (ref 27–33)
MCH RBC QN AUTO: 30.3 PG (ref 27–33)
MCHC RBC AUTO-ENTMCNC: 33.3 G/DL (ref 33.7–35.3)
MCHC RBC AUTO-ENTMCNC: 33.6 G/DL (ref 33.7–35.3)
MCV RBC AUTO: 90.4 FL (ref 81.4–97.8)
MCV RBC AUTO: 90.7 FL (ref 81.4–97.8)
METAMYELOCYTES NFR BLD MANUAL: 5 %
MONOCYTES # BLD AUTO: 0.38 K/UL (ref 0–0.85)
MONOCYTES NFR BLD AUTO: 1 % (ref 0–13.4)
MRSA DNA SPEC QL NAA+PROBE: NORMAL
MUCOUS THREADS #/AREA URNS HPF: ABNORMAL /HPF
NEUTROPHILS # BLD AUTO: 31.67 K/UL (ref 1.82–7.42)
NEUTROPHILS NFR BLD: 62 % (ref 44–72)
NEUTS BAND NFR BLD MANUAL: 22 % (ref 0–10)
NRBC # BLD AUTO: 0 K/UL
NRBC BLD AUTO-RTO: 0 /100 WBC
PLATELET # BLD AUTO: 307 K/UL (ref 164–446)
PLATELET # BLD AUTO: 333 K/UL (ref 164–446)
PLATELET BLD QL SMEAR: NORMAL
PMV BLD AUTO: 10.7 FL (ref 9–12.9)
PMV BLD AUTO: 9.7 FL (ref 9–12.9)
POTASSIUM SERPL-SCNC: 3.4 MMOL/L (ref 3.6–5.5)
POTASSIUM SERPL-SCNC: 4.4 MMOL/L (ref 3.6–5.5)
POTASSIUM SERPL-SCNC: 4.5 MMOL/L (ref 3.6–5.5)
PROCALCITONIN SERPL-MCNC: 73.39 NG/ML
PROT SERPL-MCNC: 7.2 G/DL (ref 6–8.2)
RBC # BLD AUTO: 4.54 M/UL (ref 4.7–6.1)
RBC # BLD AUTO: 4.98 M/UL (ref 4.7–6.1)
RBC # URNS HPF: ABNORMAL /HPF
RBC BLD AUTO: NORMAL
SIGNIFICANT IND 70042: NORMAL
SITE SITE: NORMAL
SODIUM SERPL-SCNC: 133 MMOL/L (ref 135–145)
SODIUM SERPL-SCNC: 140 MMOL/L (ref 135–145)
SOURCE SOURCE: NORMAL
SPECIMEN DRAWN FROM PATIENT: ABNORMAL
TOXIC GRANULES BLD QL SMEAR: SLIGHT
TROPONIN I SERPL-MCNC: 0.21 NG/ML (ref 0–0.04)
TROPONIN I SERPL-MCNC: 0.23 NG/ML (ref 0–0.04)
TROPONIN I SERPL-MCNC: 0.27 NG/ML (ref 0–0.04)
VANCOMYCIN TIMED 2584: 15.9 UG/ML
VANCOMYCIN TIMED 2584: 20.9 UG/ML
WBC # BLD AUTO: 37.7 K/UL (ref 4.8–10.8)
WBC # BLD AUTO: 39.6 K/UL (ref 4.8–10.8)
WBC #/AREA URNS HPF: ABNORMAL /HPF

## 2017-08-18 PROCEDURE — 99291 CRITICAL CARE FIRST HOUR: CPT | Performed by: INTERNAL MEDICINE

## 2017-08-18 PROCEDURE — 700111 HCHG RX REV CODE 636 W/ 250 OVERRIDE (IP)

## 2017-08-18 PROCEDURE — 700105 HCHG RX REV CODE 258: Performed by: INTERNAL MEDICINE

## 2017-08-18 PROCEDURE — 700111 HCHG RX REV CODE 636 W/ 250 OVERRIDE (IP): Performed by: HOSPITALIST

## 2017-08-18 PROCEDURE — 84132 ASSAY OF SERUM POTASSIUM: CPT

## 2017-08-18 PROCEDURE — A9270 NON-COVERED ITEM OR SERVICE: HCPCS | Performed by: INTERNAL MEDICINE

## 2017-08-18 PROCEDURE — 84145 PROCALCITONIN (PCT): CPT

## 2017-08-18 PROCEDURE — 700105 HCHG RX REV CODE 258: Performed by: HOSPITALIST

## 2017-08-18 PROCEDURE — 71250 CT THORAX DX C-: CPT

## 2017-08-18 PROCEDURE — 84484 ASSAY OF TROPONIN QUANT: CPT | Mod: 91

## 2017-08-18 PROCEDURE — 770022 HCHG ROOM/CARE - ICU (200)

## 2017-08-18 PROCEDURE — 87641 MR-STAPH DNA AMP PROBE: CPT

## 2017-08-18 PROCEDURE — 700111 HCHG RX REV CODE 636 W/ 250 OVERRIDE (IP): Performed by: INTERNAL MEDICINE

## 2017-08-18 PROCEDURE — 700102 HCHG RX REV CODE 250 W/ 637 OVERRIDE(OP): Performed by: HOSPITALIST

## 2017-08-18 PROCEDURE — 93005 ELECTROCARDIOGRAM TRACING: CPT | Performed by: HOSPITALIST

## 2017-08-18 PROCEDURE — 700102 HCHG RX REV CODE 250 W/ 637 OVERRIDE(OP): Performed by: INTERNAL MEDICINE

## 2017-08-18 PROCEDURE — 93306 TTE W/DOPPLER COMPLETE: CPT | Mod: 26 | Performed by: INTERNAL MEDICINE

## 2017-08-18 PROCEDURE — 71010 DX-CHEST-PORTABLE (1 VIEW): CPT

## 2017-08-18 PROCEDURE — 700105 HCHG RX REV CODE 258

## 2017-08-18 PROCEDURE — 700101 HCHG RX REV CODE 250

## 2017-08-18 PROCEDURE — 80202 ASSAY OF VANCOMYCIN: CPT

## 2017-08-18 PROCEDURE — 80048 BASIC METABOLIC PNL TOTAL CA: CPT

## 2017-08-18 PROCEDURE — 93010 ELECTROCARDIOGRAM REPORT: CPT | Performed by: INTERNAL MEDICINE

## 2017-08-18 PROCEDURE — A9270 NON-COVERED ITEM OR SERVICE: HCPCS | Performed by: HOSPITALIST

## 2017-08-18 PROCEDURE — 93306 TTE W/DOPPLER COMPLETE: CPT

## 2017-08-18 PROCEDURE — 87493 C DIFF AMPLIFIED PROBE: CPT

## 2017-08-18 PROCEDURE — 83605 ASSAY OF LACTIC ACID: CPT | Mod: 91

## 2017-08-18 PROCEDURE — 85027 COMPLETE CBC AUTOMATED: CPT

## 2017-08-18 PROCEDURE — 700101 HCHG RX REV CODE 250: Performed by: HOSPITALIST

## 2017-08-18 RX ORDER — DOPAMINE HYDROCHLORIDE 160 MG/100ML
0-20 INJECTION, SOLUTION INTRAVENOUS CONTINUOUS
Status: DISCONTINUED | OUTPATIENT
Start: 2017-08-18 | End: 2017-08-22

## 2017-08-18 RX ORDER — HEPARIN SODIUM 5000 [USP'U]/ML
5000 INJECTION, SOLUTION INTRAVENOUS; SUBCUTANEOUS EVERY 8 HOURS
Status: DISCONTINUED | OUTPATIENT
Start: 2017-08-18 | End: 2017-08-24 | Stop reason: HOSPADM

## 2017-08-18 RX ORDER — DOPAMINE HYDROCHLORIDE 160 MG/100ML
INJECTION, SOLUTION INTRAVENOUS
Status: COMPLETED
Start: 2017-08-18 | End: 2017-08-18

## 2017-08-18 RX ORDER — VASOPRESSIN 20 U/ML
INJECTION PARENTERAL
Status: COMPLETED
Start: 2017-08-18 | End: 2017-08-18

## 2017-08-18 RX ORDER — FAMOTIDINE 20 MG/1
20 TABLET, FILM COATED ORAL 2 TIMES DAILY
Status: DISCONTINUED | OUTPATIENT
Start: 2017-08-18 | End: 2017-08-20

## 2017-08-18 RX ADMIN — POTASSIUM CHLORIDE 20 MEQ: 14.9 INJECTION, SOLUTION INTRAVENOUS at 00:33

## 2017-08-18 RX ADMIN — PIPERACILLIN SODIUM,TAZOBACTAM SODIUM 4.5 G: 4; .5 INJECTION, POWDER, FOR SOLUTION INTRAVENOUS at 12:14

## 2017-08-18 RX ADMIN — PHENYLEPHRINE HYDROCHLORIDE 150 MCG/MIN: 10 INJECTION INTRAVENOUS at 05:31

## 2017-08-18 RX ADMIN — NOREPINEPHRINE BITARTRATE 20 MCG/MIN: 1 INJECTION, SOLUTION, CONCENTRATE INTRAVENOUS at 20:39

## 2017-08-18 RX ADMIN — NOREPINEPHRINE BITARTRATE 30 MCG/MIN: 1 INJECTION, SOLUTION, CONCENTRATE INTRAVENOUS at 10:04

## 2017-08-18 RX ADMIN — VASOPRESSIN 0.03 UNITS/MIN: 20 INJECTION INTRAVENOUS at 17:51

## 2017-08-18 RX ADMIN — VASOPRESSIN 0.03 UNITS/MIN: 20 INJECTION INTRAVENOUS at 05:33

## 2017-08-18 RX ADMIN — FENTANYL CITRATE 25 MCG: 50 INJECTION, SOLUTION INTRAMUSCULAR; INTRAVENOUS at 00:53

## 2017-08-18 RX ADMIN — ONDANSETRON 4 MG: 2 INJECTION INTRAMUSCULAR; INTRAVENOUS at 03:34

## 2017-08-18 RX ADMIN — HYDROCODONE BITARTRATE AND ACETAMINOPHEN 2 TABLET: 10; 325 TABLET ORAL at 20:41

## 2017-08-18 RX ADMIN — NOREPINEPHRINE BITARTRATE 30 MCG/MIN: 1 INJECTION, SOLUTION, CONCENTRATE INTRAVENOUS at 05:32

## 2017-08-18 RX ADMIN — SODIUM CHLORIDE: 9 INJECTION, SOLUTION INTRAVENOUS at 06:35

## 2017-08-18 RX ADMIN — ACETAMINOPHEN 650 MG: 325 TABLET, FILM COATED ORAL at 03:34

## 2017-08-18 RX ADMIN — HEPARIN SODIUM 5000 UNITS: 5000 INJECTION, SOLUTION INTRAVENOUS; SUBCUTANEOUS at 23:36

## 2017-08-18 RX ADMIN — DOPAMINE HYDROCHLORIDE 5 MCG/KG/MIN: 160 INJECTION, SOLUTION INTRAVENOUS at 07:06

## 2017-08-18 RX ADMIN — FAMOTIDINE 20 MG: 20 TABLET ORAL at 20:38

## 2017-08-18 RX ADMIN — SODIUM CHLORIDE: 9 INJECTION, SOLUTION INTRAVENOUS at 12:14

## 2017-08-18 RX ADMIN — HEPARIN SODIUM 5000 UNITS: 5000 INJECTION, SOLUTION INTRAVENOUS; SUBCUTANEOUS at 16:21

## 2017-08-18 RX ADMIN — MAGNESIUM SULFATE 1 G: 1 INJECTION INTRAVENOUS at 01:16

## 2017-08-18 RX ADMIN — PHENYLEPHRINE HYDROCHLORIDE 250 MCG/MIN: 10 INJECTION INTRAVENOUS at 11:01

## 2017-08-18 RX ADMIN — PROCHLORPERAZINE EDISYLATE 10 MG: 5 INJECTION INTRAMUSCULAR; INTRAVENOUS at 11:08

## 2017-08-18 RX ADMIN — NOREPINEPHRINE BITARTRATE: 1 INJECTION INTRAVENOUS at 01:14

## 2017-08-18 RX ADMIN — NOREPINEPHRINE BITARTRATE 30 MCG/MIN: 1 INJECTION, SOLUTION, CONCENTRATE INTRAVENOUS at 14:40

## 2017-08-18 RX ADMIN — PHENYLEPHRINE HYDROCHLORIDE 300 MCG/MIN: 10 INJECTION INTRAVENOUS at 08:10

## 2017-08-18 RX ADMIN — VASOPRESSIN 20 UNITS: 20 INJECTION INTRAVENOUS at 05:32

## 2017-08-18 RX ADMIN — PHENYLEPHRINE HYDROCHLORIDE 150 MCG/MIN: 10 INJECTION INTRAVENOUS at 05:33

## 2017-08-18 RX ADMIN — PIPERACILLIN SODIUM,TAZOBACTAM SODIUM 4.5 G: 4; .5 INJECTION, POWDER, FOR SOLUTION INTRAVENOUS at 23:37

## 2017-08-18 RX ADMIN — NOREPINEPHRINE BITARTRATE: 1 INJECTION INTRAVENOUS at 05:30

## 2017-08-18 RX ADMIN — SODIUM CHLORIDE: 9 INJECTION, SOLUTION INTRAVENOUS at 20:39

## 2017-08-18 RX ADMIN — NOREPINEPHRINE BITARTRATE 30 MCG/MIN: 1 INJECTION, SOLUTION, CONCENTRATE INTRAVENOUS at 01:14

## 2017-08-18 RX ADMIN — PIPERACILLIN SODIUM,TAZOBACTAM SODIUM 4.5 G: 4; .5 INJECTION, POWDER, FOR SOLUTION INTRAVENOUS at 17:51

## 2017-08-18 RX ADMIN — HYDROCODONE BITARTRATE AND ACETAMINOPHEN 2 TABLET: 10; 325 TABLET ORAL at 13:45

## 2017-08-18 RX ADMIN — FENTANYL CITRATE 50 MCG: 50 INJECTION, SOLUTION INTRAMUSCULAR; INTRAVENOUS at 04:47

## 2017-08-18 RX ADMIN — VANCOMYCIN HYDROCHLORIDE 1400 MG: 10 INJECTION, POWDER, LYOPHILIZED, FOR SOLUTION INTRAVENOUS at 20:39

## 2017-08-18 RX ADMIN — HYDROCODONE BITARTRATE AND ACETAMINOPHEN 2 TABLET: 10; 325 TABLET ORAL at 08:49

## 2017-08-18 RX ADMIN — PIPERACILLIN SODIUM,TAZOBACTAM SODIUM 4.5 G: 4; .5 INJECTION, POWDER, FOR SOLUTION INTRAVENOUS at 07:50

## 2017-08-18 RX ADMIN — ONDANSETRON 4 MG: 2 INJECTION INTRAMUSCULAR; INTRAVENOUS at 07:11

## 2017-08-18 RX ADMIN — VANCOMYCIN HYDROCHLORIDE 1400 MG: 10 INJECTION, POWDER, LYOPHILIZED, FOR SOLUTION INTRAVENOUS at 08:10

## 2017-08-18 ASSESSMENT — PAIN SCALES - GENERAL
PAINLEVEL_OUTOF10: 8
PAINLEVEL_OUTOF10: 7
PAINLEVEL_OUTOF10: 10
PAINLEVEL_OUTOF10: 6
PAINLEVEL_OUTOF10: 8
PAINLEVEL_OUTOF10: 8
PAINLEVEL_OUTOF10: 7
PAINLEVEL_OUTOF10: 6
PAINLEVEL_OUTOF10: 10
PAINLEVEL_OUTOF10: 5
PAINLEVEL_OUTOF10: 6
PAINLEVEL_OUTOF10: 6
PAINLEVEL_OUTOF10: 9

## 2017-08-18 ASSESSMENT — LIFESTYLE VARIABLES: DO YOU DRINK ALCOHOL: NO

## 2017-08-18 NOTE — OP REPORT
Reason for procedure: hypotensive shock    Type of Procedure: right internal jugular line placement under US    Course of procedure:  Informed consent was obtained, the patient was explained risks, benefits alternatives and complications. The patient signed the consent. Time out was taken. Patient was draped and dressed in a sterile fashion in the reverse trendelenburg position. The right nape of the neck was disinfected with chlorhexidine rinse for 60 seconds. I was dressed in sterile fashion including hat, mask, gown and gloves, and my assistants were wearing hat and mask and gloves. The right IJ was pierced with a 21 gauge needle under US visualization. The guide was introduced through the needle into position and the needle was removed and discarded. A small incision was made along the guide wire. A dilator probe was advanced over the wire and the skin dilated. The triple lumen catheter was than advanced over the guide wire into position. The guide wire was removed. The triple lumen catheter was secured to the nape of the neck using two interrupted sutures.  All three ports were tested and flushed. A bio-disk was applied at the entrance of the catheter through the skin. A Tegaderm was used to cover the area. Postoperatively a stat portable chest x-ray was ordered.     Complications: none    Blood Loss: less than 5 ml

## 2017-08-18 NOTE — ASSESSMENT & PLAN NOTE
- in setting of immunocompromised state. Yeast in sputum likely colonizer, but as has thrush too and clinically improving  Tolerating oral augmenting and azithromycin  Anticipate discharge tomorrow

## 2017-08-18 NOTE — PROGRESS NOTES
2 RN skin assessment done; skin not WDL. Pt with history of psoriasis and with generalized flaky psoriatic lesions, no breakdown in skin integrity noted.

## 2017-08-18 NOTE — PROGRESS NOTES
Direct admit via careflight from [place]. Assisted to slideboard to bed. Dr. Shea at bedside. Central line placed. Assessment complete. Pt alert and oriented x 4, very weak and lethargic.   Levophed, vasopressin and neosynephrine infusing. MD aware BP cuff is only measuring MAPs. 2 L fluid bolus ordered per MD.   Pt's wife at bedside. POC discussed. Will continue with care.

## 2017-08-18 NOTE — PROGRESS NOTES
Dr. Estrella notified of BPs 70s/40s; unable to determine BP at times even with manual BP cuff; pt now maxed on levophed, neosynephrine, and vasopressin infusing as well. MD ordered dopamine to start and to order zosyn antibiotic and DC rocephin.  Spoke with Dr. Barker as well; notified of current patient status and pressor requirements with inability to determine BP at times.    Dopamine started. Report to FORREST Velazco.

## 2017-08-18 NOTE — PROGRESS NOTES
Priscilla from Lab called with critical result of WBC of 39.1 at 0450. Critical lab result read back to Priscilla.   Dr. Estrella notified of critical lab result at 0545.  Critical lab result read back by Dr. Estrella.  Dr. Estrella notified patient had a bout of chest pain, now relieved, with a troponin result of 0.21. MD states to get another troponin 4 hours after draw at 0815. Pt remains resting comfortably in bed. Will continue with care.

## 2017-08-18 NOTE — ASSESSMENT & PLAN NOTE
- continue to hold methotrexate for now. Continue topicals.  Antibiotic ointment in left antecubital area

## 2017-08-18 NOTE — PROGRESS NOTES
Medical records received from [facility]:  ER report and Demographics scanned into Media tab.

## 2017-08-18 NOTE — CARE PLAN
Problem: Communication  Goal: The ability to communicate needs accurately and effectively will improve  Outcome: PROGRESSING AS EXPECTED  Communication will remain effective with patient and family. Both have been updated on POC as changes made/initiated. Both encouraged to ask questions and they arise.     Problem: Safety  Goal: Will remain free from injury  Outcome: PROGRESSING AS EXPECTED  Pt will remain free from injury. Pt has been educated on the call light and demonstrates its use appropriately. Fall precautions in place.

## 2017-08-18 NOTE — H&P
Hospital Medicine History and Physical    Date of Service  8/17/2017    Chief Complaint  Altered level of consciousness with ongoing nausea and vomiting.    History of Presenting Illness  46 y.o. male who presented 8/17/2017 with nausea vomiting and dehydration. Patient lives up in [facility]. He was started on methotrexate a week ago for his psoriasis. Following this the patient was not feeling well, over the past 24-48 hours he developed nausea vomiting generalized weakness and fevers and chills. Patient was also having coughing episodes. Patient went to the [facility] emergency room today where his initial blood pressure was found to be only 51/26. He was immediately given 3 L of normal saline is only brought his systolic blood pressure up to 70. Since they do not have an ICU I was called to accept the patient at that point in time. After speaking with the PA of their I informed them that I would only accept the patient if his blood pressure came up to over 100. Patient was begun only Levophed and his blood pressure came up to 110. Patient was then life flighted to [facility] for treatment in the ICU. Patient was directly admitted to the ICU there a central line was placed for him please see separate note for this and patient was begun on aggressive leave that management along with vasopressor management and fluid resuscitation as a 3rd agent Elías-Synephrine was added to back up. Patient and his workup was found to have a right lower lobe pneumonia. His white blood cell count is elevated at 34.8 and his lactic acid level is elevated at 6.8. Patient at this point is admitted to the ICU with severe sepsis. Patient is point is in a serious condition and I discussed this with the family. Patient will continue with IV antibiotics and respiratory care and pressor use.    Critical Care time 45 minutes, no procedures, no interventions, no overlap    Primary Care Physician  Pcp Pt States  None    Consultants  None    Code Status  Full code    Review of Systems  ROS unable to provide due to mental status change.    Past Medical History  Psoriasis, arthritis, gastroesophageal reflux disease, low back pain.  Surgical History  No previous surgical history is known.  Medications  Prilosec 20 mg by mouth daily  Methotrexate 50 mg by mouth every weekly  Folic acid 1 mg by mouth daily  Morphine extended release 30 mg every 12 hours by mouth  Norco 10/3/25 one tablet every 4 hours when necessary for pain  Ambien 10 mg by mouth daily at bedtime  Naprosyn 500 mg by mouth twice a day    Family History    Patient currently unable to tell me family history due to mental status change.    Social History  Patient is a long-standing smoker for about 30 years 2 packs per day he quit 2 years ago.  Patient has alcohol abuse he quit 10 years ago he was a very heavy alcoholic previous to that.  He has ongoing marijuana use which is daily. Per family has a medical marijuana card. Allergies  Wellbutrin  Cortisone    Physical Exam  Laboratory   Hemodynamics  Temp (24hrs), Av.5 °C (99.5 °F), Min:37.5 °C (99.5 °F), Max:37.5 °C (99.5 °F)   Temperature: 37.5 °C (99.5 °F)  No Data Recorded           Respiratory                    Physical Exam  46-year-old male who is acutely ill and septic with altered mental status.  Head is atraumatic, ice follow in normal range of gaze, pupils are equal round and reactive bilaterally, sclerae anicteric, conjunctiva is of normal hue.  Nose is midline without discharge no nasal discharge no bleeding from the nose no fracture the nose identified.  Ears bilaterally are intact there is no discharge from ears ear canals are patent no mastoid tenderness.  Oral cavity extremely dry mucous membranes very poor dentition tongue and uvula are midline no focus of infection in the oral cavity. Neck soft supple no JVD carotid bruit thyromegaly or lymphadenopathy appreciated.  Chest wall moves equally  with inspiration and expiration no paradoxical motion no reversible chest wall tenderness.  Heart S1-S2 tachycardic no murmurs or gallops detected. Tachycardias in the 140 range.  Lungs diminished breath sounds in all lung fields there are rails bilaterally but more on the right than the left  Abdomen is distended bowel sounds are diminished no splenomegaly no hepatomegaly appreciated. Could not elicit any tenderness or rebound.  Genitals Vidales catheter is in place otherwise normal male genitals.  Rectal examination for right now is deferred.  Upper and lower extremity positive pulses no edema good muscle strength and muscle tone positive deep tendon reflexes.  Neurologically cranial nerves II 12 grossly within normal limits without any focal deficits appreciated.  Skin patient has psoriatic rash throughout the extremities and also on the abdomen and back.           No results for input(s): ALTSGPT, ASTSGOT, ALKPHOSPHAT, TBILIRUBIN, DBILIRUBIN, GAMMAGT, AMYLASE, LIPASE, ALB, PREALBUMIN, GLUCOSE in the last 72 hours.              No results found for: TROPONINI    Imaging  DX-CHEST-PORTABLE (1 VIEW)   Preliminary Result      DX-CHEST-PORTABLE (1 VIEW)    (Results Pending)   CT-CHEST (THORAX) W/O    (Results Pending)   Echocardiogram Comp W/O Cont    (Results Pending)        Assessment/Plan     I anticipate this patient will require at least two midnights for appropriate medical management, necessitating inpatient admission.    * Severe sepsis (CMS-HCC) (present on admission)  Assessment & Plan  This is severe sepsis with the following associated acute organ dysfunction(s): acute kidney failure.  Patient's severe sepsis is secondary to immunocompromise status associated with methotrexate use for psoriasis.  Patient at this point has been started on vasopressors with Levophed, this was maximized. Patient then was switched over as a 2nd agent to vasopressin, this now is maximized patient will start  addi-Synephrine.  Patient was given 5 L of fluid before admitting him here. Patient will continue to's point with fluid resuscitation patient has become extremely dehydrated with nausea vomiting that was ongoing for about 24 hours.  Patient will be started on vancomycin and Rocephin for his pneumonia.  Monitor lactic acid level which was initially elevated at 6.8. Obtain pro-calcitonin level. Patient will be followed with his white blood cell count which was initially elevated at 34.8.    Right lower lobe pneumonia (present on admission)  Assessment & Plan  Secondary to immunocompromise with methotrexate use for psoriasis.  White blood cell count is elevated we will trend this.  Continue with vancomycin and Rocephin follow sputum and blood cultures.    Immunocompromised status associated with infection (CMS-HCC) (present on admission)  Assessment & Plan  Secondary to methotrexate methotrexate will for now be held.    Psoriasis (present on admission)  Assessment & Plan  For right now we will hold the methotrexate continue with topical solutions for psoriasis.    Marijuana use, continuous (present on admission)  Assessment & Plan  He has been using this since he was age 15 he will need to be counseled on potential cessation this will cause lung disease with COPD just the same as tobacco abuse.    Gastric ulcer (present on admission)  Assessment & Plan  Continue with PPI discontinue Naprosyn use.    History of tobacco abuse (present on admission)  Assessment & Plan  Currently in remission no need for additional action at this point.    History of alcohol abuse (present on admission)  Assessment & Plan  Per spouse he has not had an alcoholic beverage in over 10 years.    Chronic back pain (present on admission)  Assessment & Plan  Continue with pain management. I will give him IV fentanyl 25 µg to 50 µg every hour when necessary for pain. For right now I will hold off on his long-acting morphine with his  hypotension.      VTE prophylaxis: Lovenox .

## 2017-08-18 NOTE — PROGRESS NOTES
0700-Report from FORREST Kennedy. POC reviewed. All lines and drips over viewed with Marcia. Dopamine started at 5 mcgs/kg/min as pt's BP only calculating MAPs. Pt c/o lower back pain at this time, but does understand importance of monitoring BP. Wife at bedside. Call light in reach. Will continue to monitor.     1000-Pt cleared for liquid diet. Provided tray to pt. Tolerated well w/ no c/o nausea or increased abdominal pain.     1035-Pt taken to CT via hospital bed with ICU monitoring in place.     1100-Pt back to room from CT. Tolerated well. Drips continued to be titrated as tolerated by patient.

## 2017-08-18 NOTE — ASSESSMENT & PLAN NOTE
- This is severe sepsis with the following associated acute organ dysfunction(s): acute kidney failure, acute respiratory failure. resolved  - doing well off pressors remained stable and getting close to discharge  - deescalated antibiotics 8/22  - continue to monitor on telemetry.

## 2017-08-18 NOTE — ASSESSMENT & PLAN NOTE
demand ischemia from sepsis. Wall abnormalities on echo early in admit have improved and nearly resolved, EF went from 45 to 55%  Edema resolving, no chest pain.

## 2017-08-18 NOTE — PROGRESS NOTES
Patient is a direct admit from [facility]. Dr Shea is accepting the patient. Outside medical records scanned into the media tab.

## 2017-08-18 NOTE — ASSESSMENT & PLAN NOTE
- continue BD per RT protocol. Now tolerating room air.  Continues to improved clinically, tolerating oral antbiotics

## 2017-08-18 NOTE — PROGRESS NOTES
Priscilla from Lab called with critical result of lactic acid of 7.91 at 0240. Critical lab result read back to Priscilla.   Dr. monroe notified of critical lab result at 0250.  Critical lab result read back by Dr. Monroe.

## 2017-08-18 NOTE — PROGRESS NOTES
Priscilla from Lab called with critical result of lactic acid of 6.28 at 2228. Critical lab result read back to Priscilla.   Dr. Keyes notified of critical lab result at 3133.  Critical lab result read back by Dr. Keyes.  Orders received to replace Mg and K+ once per MD.

## 2017-08-18 NOTE — PROGRESS NOTES
Priscilla from Lab called with critical result of WBC of 37.7 at 0025. Critical lab result read back to Priscilla.   Dr. Keyes notified of critical lab result at 0100.  Critical lab result read back by Dr. Keyes.

## 2017-08-18 NOTE — ASSESSMENT & PLAN NOTE
- continue kdur 40mEq increased to 3 times a day, level low again due to diuresis; increase to 80 meq and recheck bmp tomorrow

## 2017-08-18 NOTE — PROGRESS NOTES
Pt with tachypnea to 30s and shallow breathing. Dr. Keyes updated and ordered STAT chest xray. Dr. Keyes at bedside, aware BPs continue to only read MAPs.   Pt states transient chest/upper abdominal pain. EKG complete without any ST elevation/depression noted- sinus tachycardia rate 121. Scheduled troponin drawn and sent. Pt states relief of pain at this time. Will continue with care.

## 2017-08-18 NOTE — PROGRESS NOTES
[facility] Hospitalist Progress Note    Date of Service: 2017    Chief Complaint  46/M with psoriasis on MTX, GERD, admitted for ALOC, with ongoing N/V, with fevers and chills, along with cough. Noted to be hypotensive at an outside facility, minimally improved with IVF and started on levophed and transferred to [facility]. WBC 34.8. Lactate 6.8. Na 133. K 3.4. CXR showed dense right upper lobe opacity. Felt to have pneumonia. Started on IVF, antibiotics (zosyn, vancomycin), and pressors.       Interval Problem Update  2017 - Now on 3 pressors. Tachycardic. Afebrile. On 8L O2 oxymask.  WBC 39.6. Crea 2.05 (from 2.45). Na, K WNL. Lactate 7.60. Trop 0.21. Blood cultures remain negative. Pending C diff .    > Seen and examined. Weak looking. (+) SOB, with nonproductive cough. (+) 4 episodes of watery diarrhea thi smorninbg. (+) abd pain. No nausea, vomiting. (+) chronic back pain.    Consultants/Specialty  None    Disposition  Monitor in the ICU.    Time spent: 55 minutes CRITICAL CARE TIME, including managing medical issues, coordination of care, not including doing procedures, no overlap.        ROS     Pertinent positives/negatives as mentioned above.     A complete review of systems was done. All other systems were negative.      Physical Exam  Laboratory/Imaging   Hemodynamics  Temp (24hrs), Av.4 °C (99.3 °F), Min:37.2 °C (99 °F), Max:37.5 °C (99.5 °F)   Temperature: 37.2 °C (99 °F), Monitored Temp: 36.8 °C (98.2 °F)  Pulse  Av.1  Min: 83  Max: 132 Heart Rate (Monitored): 88  NIBP: 107/74 mmHg CVP (mm Hg): (!) 15 MM HG    Respiratory      Respiration: (!) 22, Pulse Oximetry: 95 %, O2 Daily Delivery Respiratory : Nasal Cannula     Work Of Breathing / Effort: Moderate;Shallow;Tachypnea  RUL Breath Sounds: Clear, RML Breath Sounds: Diminished, RLL Breath Sounds: Diminished, FREDO Breath Sounds: Clear, LLL Breath Sounds: Diminished    Fluids    Intake/Output Summary (Last 24 hours) at 17 2456  Last data  filed at 08/18/17 1400   Gross per 24 hour   Intake 50714.63 ml   Output   3585 ml   Net 9411.63 ml       Nutrition  Orders Placed This Encounter   Procedures   • DIET ORDER     Standing Status: Standing      Number of Occurrences: 1      Standing Expiration Date:      Order Specific Question:  Diet:     Answer:  Clear Liquid [10]     Physical Exam   Constitutional: He appears well-developed. No distress.   HENT:   Head: Normocephalic and atraumatic.   Mouth/Throat: Oropharynx is clear and moist. No oropharyngeal exudate.   Dry lips and oral mucosa   Eyes: EOM are normal. Pupils are equal, round, and reactive to light. Right eye exhibits no discharge. Left eye exhibits no discharge. No scleral icterus.   Neck: Normal range of motion. Neck supple. No thyromegaly present.   Cardiovascular: Regular rhythm.  Exam reveals no gallop and no friction rub.    No murmur heard.  Tachycardic.    Pulmonary/Chest: Effort normal. He has no wheezes. He exhibits no tenderness.   Crackles on the RUL. Diminished air entry B/L bases.   Abdominal: Soft. Bowel sounds are normal. There is tenderness (modest, diffuse). There is no rebound and no guarding.   Musculoskeletal: Normal range of motion. He exhibits no edema or tenderness.   Lymphadenopathy:     He has no cervical adenopathy.   Neurological: He is alert.   Skin: Skin is warm and dry. No rash noted. He is not diaphoretic. No erythema.   Psychiatric: He has a normal mood and affect. His behavior is normal. Thought content normal.   Vitals reviewed.        Recent Labs      08/17/17 2215 08/18/17 0410   WBC  37.7*  39.6*   RBC  4.98  4.54*   HEMOGLOBIN  15.1  13.7*   HEMATOCRIT  45.0  41.2*   MCV  90.4  90.7   MCH  30.3  30.2   MCHC  33.6*  33.3*   RDW  47.7  49.1   PLATELETCT  333  307   MPV  10.7  9.7     Recent Labs      08/17/17 2215 08/18/17   0410  08/18/17   1020   SODIUM  133*  140   --    POTASSIUM  3.4*  3.3*  4.5  4.4   CHLORIDE  104  111   --    CO2  14*  13*    --    GLUCOSE  96  111*   --    BUN  22  27*   --    CREATININE  2.45*  2.05*   --    CALCIUM  7.4*  7.4*   --      Recent Labs      08/17/17   2215   APTT  29.9   INR  1.27*                  Assessment/Plan     * Septic shock due to CAP, immunocompromised state (CMS-HCC) (present on admission)  Assessment & Plan  - This is severe sepsis with the following associated acute organ dysfunction(s): acute kidney failure, acute respiratory failure.  - continue aggressive IVF resuscitation with NS at 150cc/hr, to keep CVP 8-12. Continue pressor support to keep MAP>65. Continue serial lactate.   - continue broad spectrum antibiotics, with vancomycin and zosyn. Check procalcitonin and trend. Will get CT chest to further evaluate for extent of pneumonia. Send for sputum GS/Culture, and MRSA ICS.   - follow blood cultures. Send for C diff PCR given diarrhea.   - close hemodynamic monitoring in the ICU.     Right lower lobe pneumonia (present on admission)  Assessment & Plan  - in setting of immunocompromised state.   - continue vancomycin and zosyn. Check procalcitonin and trend. Will get CT chest to further evaluate for extent of pneumonia. Send for sputum GS/Culture, and MRSA ICS.     Immunocompromised status associated with infection (CMS-HCC) (present on admission)  Assessment & Plan  - holding methotrexate for now.     Acute renal failure (ARF) due to severe sepsis/septic shock (CMS-HCC) (present on admission)  Assessment & Plan  - continue IVF. Follow renal function with IVF resuscitation. Monitor UO.     Elevated troponin level due to demand ischemia from sepsis (present on admission)  Assessment & Plan  - demand ischemia from sepsis. Will get echo. Continue cardiac monitoring.     Hypovolemic hyponatremia (present on admission)  Assessment & Plan  - due to sepsis. Continue IVF, follow levels.     Hypokalemia (present on admission)  Assessment & Plan  - replaced. Continue to monitor. D/C q6 K checks. BMP in AM.    Acute  respiratory failure with hypoxia due to sepsis, CAP (CMS-HCC) (present on admission)  Assessment & Plan  - continue BD per RT protocol. Continue O2 support.   - continue antibiotics for CAP.     Psoriasis (present on admission)  Assessment & Plan  - hold methotrexate for now due to severe sepsis.     History of tobacco abuse (present on admission)  Assessment & Plan  - Currently in remission.    Marijuana use, continuous (present on admission)  Assessment & Plan  - will need counseling once more stable.     History of alcohol abuse (present on admission)  Assessment & Plan  - remains sober x 10 years now.    Gastric ulcer (present on admission)  Assessment & Plan  - continue PPI. Avoid NSAIDS.    Chronic back pain (present on admission)  Assessment & Plan  - unable to give him narcotics given his septic shock, and NSAIDs due to PURA.   - PRN tylenol for now.     Labs reviewed, Medications reviewed and Radiology images reviewed  Vidales catheter: No Vidales      DVT Prophylaxis: Heparin    Ulcer prophylaxis: Yes  Antibiotics: Treating active infection/contamination beyond 24 hours perioperative coverage  Assessed for rehab: Patient unable to tolerate rehabilitation therapeutic regimen

## 2017-08-19 ENCOUNTER — APPOINTMENT (OUTPATIENT)
Dept: RADIOLOGY | Facility: MEDICAL CENTER | Age: 47
DRG: 871 | End: 2017-08-19
Attending: HOSPITALIST
Payer: COMMERCIAL

## 2017-08-19 LAB
ANION GAP SERPL CALC-SCNC: 7 MMOL/L (ref 0–11.9)
BUN SERPL-MCNC: 23 MG/DL (ref 8–22)
CALCIUM SERPL-MCNC: 7.2 MG/DL (ref 8.4–10.2)
CHLORIDE SERPL-SCNC: 114 MMOL/L (ref 96–112)
CHOLEST SERPL-MCNC: 104 MG/DL (ref 100–199)
CO2 SERPL-SCNC: 18 MMOL/L (ref 20–33)
CREAT SERPL-MCNC: 1.42 MG/DL (ref 0.5–1.4)
ERYTHROCYTE [DISTWIDTH] IN BLOOD BY AUTOMATED COUNT: 50.9 FL (ref 35.9–50)
GFR SERPL CREATININE-BSD FRML MDRD: 53 ML/MIN/1.73 M 2
GLUCOSE SERPL-MCNC: 163 MG/DL (ref 65–99)
GRAM STN SPEC: NORMAL
HCT VFR BLD AUTO: 34.1 % (ref 42–52)
HDLC SERPL-MCNC: 16 MG/DL
HGB BLD-MCNC: 11.4 G/DL (ref 14–18)
LACTATE BLD-SCNC: 2.8 MMOL/L (ref 0.5–2)
LACTATE BLD-SCNC: 3.1 MMOL/L (ref 0.5–2)
LDLC SERPL CALC-MCNC: 54 MG/DL
MAGNESIUM SERPL-MCNC: 2 MG/DL (ref 1.5–2.5)
MCH RBC QN AUTO: 30.6 PG (ref 27–33)
MCHC RBC AUTO-ENTMCNC: 33.4 G/DL (ref 33.7–35.3)
MCV RBC AUTO: 91.4 FL (ref 81.4–97.8)
PLATELET # BLD AUTO: 234 K/UL (ref 164–446)
PMV BLD AUTO: 9.5 FL (ref 9–12.9)
POTASSIUM SERPL-SCNC: 3.2 MMOL/L (ref 3.6–5.5)
PROCALCITONIN SERPL-MCNC: 107.49 NG/ML
PROCALCITONIN SERPL-MCNC: 98.45 NG/ML
RBC # BLD AUTO: 3.73 M/UL (ref 4.7–6.1)
SIGNIFICANT IND 70042: NORMAL
SITE SITE: NORMAL
SODIUM SERPL-SCNC: 139 MMOL/L (ref 135–145)
SOURCE SOURCE: NORMAL
SPECIMEN DRAWN FROM PATIENT: ABNORMAL
SPECIMEN DRAWN FROM PATIENT: ABNORMAL
TRIGL SERPL-MCNC: 168 MG/DL (ref 0–149)
WBC # BLD AUTO: 34.4 K/UL (ref 4.8–10.8)

## 2017-08-19 PROCEDURE — 700105 HCHG RX REV CODE 258: Performed by: HOSPITALIST

## 2017-08-19 PROCEDURE — 87205 SMEAR GRAM STAIN: CPT

## 2017-08-19 PROCEDURE — 700102 HCHG RX REV CODE 250 W/ 637 OVERRIDE(OP): Performed by: INTERNAL MEDICINE

## 2017-08-19 PROCEDURE — 83605 ASSAY OF LACTIC ACID: CPT | Mod: 91

## 2017-08-19 PROCEDURE — 71010 DX-CHEST-PORTABLE (1 VIEW): CPT

## 2017-08-19 PROCEDURE — 83735 ASSAY OF MAGNESIUM: CPT

## 2017-08-19 PROCEDURE — 80048 BASIC METABOLIC PNL TOTAL CA: CPT

## 2017-08-19 PROCEDURE — 700105 HCHG RX REV CODE 258: Performed by: INTERNAL MEDICINE

## 2017-08-19 PROCEDURE — 85027 COMPLETE CBC AUTOMATED: CPT

## 2017-08-19 PROCEDURE — A9270 NON-COVERED ITEM OR SERVICE: HCPCS | Performed by: INTERNAL MEDICINE

## 2017-08-19 PROCEDURE — 700111 HCHG RX REV CODE 636 W/ 250 OVERRIDE (IP): Performed by: HOSPITALIST

## 2017-08-19 PROCEDURE — 700101 HCHG RX REV CODE 250: Performed by: INTERNAL MEDICINE

## 2017-08-19 PROCEDURE — 99233 SBSQ HOSP IP/OBS HIGH 50: CPT | Performed by: INTERNAL MEDICINE

## 2017-08-19 PROCEDURE — 700111 HCHG RX REV CODE 636 W/ 250 OVERRIDE (IP): Performed by: INTERNAL MEDICINE

## 2017-08-19 PROCEDURE — 80061 LIPID PANEL: CPT

## 2017-08-19 PROCEDURE — 700102 HCHG RX REV CODE 250 W/ 637 OVERRIDE(OP): Performed by: HOSPITALIST

## 2017-08-19 PROCEDURE — 87070 CULTURE OTHR SPECIMN AEROBIC: CPT

## 2017-08-19 PROCEDURE — 84145 PROCALCITONIN (PCT): CPT

## 2017-08-19 PROCEDURE — 94640 AIRWAY INHALATION TREATMENT: CPT

## 2017-08-19 PROCEDURE — 94760 N-INVAS EAR/PLS OXIMETRY 1: CPT

## 2017-08-19 PROCEDURE — 700101 HCHG RX REV CODE 250: Performed by: HOSPITALIST

## 2017-08-19 PROCEDURE — 770022 HCHG ROOM/CARE - ICU (200)

## 2017-08-19 PROCEDURE — A9270 NON-COVERED ITEM OR SERVICE: HCPCS | Performed by: HOSPITALIST

## 2017-08-19 RX ORDER — CALCIUM CARBONATE 500 MG/1
500 TABLET, CHEWABLE ORAL 3 TIMES DAILY PRN
Status: DISCONTINUED | OUTPATIENT
Start: 2017-08-19 | End: 2017-08-24 | Stop reason: HOSPADM

## 2017-08-19 RX ORDER — POTASSIUM CHLORIDE 14.9 MG/ML
20 INJECTION INTRAVENOUS
Status: COMPLETED | OUTPATIENT
Start: 2017-08-19 | End: 2017-08-19

## 2017-08-19 RX ORDER — ASPIRIN 325 MG
325 TABLET ORAL DAILY
Status: DISCONTINUED | OUTPATIENT
Start: 2017-08-19 | End: 2017-08-24 | Stop reason: HOSPADM

## 2017-08-19 RX ORDER — ATORVASTATIN CALCIUM 40 MG/1
40 TABLET, FILM COATED ORAL
Status: DISCONTINUED | OUTPATIENT
Start: 2017-08-19 | End: 2017-08-24 | Stop reason: HOSPADM

## 2017-08-19 RX ORDER — LOPERAMIDE HYDROCHLORIDE 2 MG/1
2 CAPSULE ORAL 4 TIMES DAILY PRN
Status: DISCONTINUED | OUTPATIENT
Start: 2017-08-19 | End: 2017-08-24 | Stop reason: HOSPADM

## 2017-08-19 RX ORDER — ALBUTEROL SULFATE 90 UG/1
2 AEROSOL, METERED RESPIRATORY (INHALATION) EVERY 4 HOURS PRN
Status: DISCONTINUED | OUTPATIENT
Start: 2017-08-19 | End: 2017-08-24 | Stop reason: HOSPADM

## 2017-08-19 RX ADMIN — PIPERACILLIN SODIUM,TAZOBACTAM SODIUM 4.5 G: 4; .5 INJECTION, POWDER, FOR SOLUTION INTRAVENOUS at 13:20

## 2017-08-19 RX ADMIN — NOREPINEPHRINE BITARTRATE 5 MCG/MIN: 1 INJECTION, SOLUTION, CONCENTRATE INTRAVENOUS at 14:25

## 2017-08-19 RX ADMIN — HEPARIN SODIUM 5000 UNITS: 5000 INJECTION, SOLUTION INTRAVENOUS; SUBCUTANEOUS at 22:19

## 2017-08-19 RX ADMIN — FAMOTIDINE 20 MG: 10 INJECTION, SOLUTION INTRAVENOUS at 07:50

## 2017-08-19 RX ADMIN — PIPERACILLIN SODIUM,TAZOBACTAM SODIUM 4.5 G: 4; .5 INJECTION, POWDER, FOR SOLUTION INTRAVENOUS at 17:41

## 2017-08-19 RX ADMIN — VANCOMYCIN HYDROCHLORIDE 1400 MG: 10 INJECTION, POWDER, LYOPHILIZED, FOR SOLUTION INTRAVENOUS at 07:47

## 2017-08-19 RX ADMIN — PROCHLORPERAZINE EDISYLATE 10 MG: 5 INJECTION INTRAMUSCULAR; INTRAVENOUS at 08:46

## 2017-08-19 RX ADMIN — CALCIUM CARBONATE (ANTACID) CHEW TAB 500 MG 500 MG: 500 CHEW TAB at 19:22

## 2017-08-19 RX ADMIN — ASPIRIN 325 MG ORAL TABLET 325 MG: 325 PILL ORAL at 14:25

## 2017-08-19 RX ADMIN — ALBUTEROL SULFATE 2 PUFF: 90 AEROSOL, METERED RESPIRATORY (INHALATION) at 09:48

## 2017-08-19 RX ADMIN — POTASSIUM CHLORIDE 20 MEQ: 14.9 INJECTION, SOLUTION INTRAVENOUS at 11:23

## 2017-08-19 RX ADMIN — ATORVASTATIN CALCIUM 40 MG: 40 TABLET, FILM COATED ORAL at 20:19

## 2017-08-19 RX ADMIN — VANCOMYCIN HYDROCHLORIDE 1400 MG: 10 INJECTION, POWDER, LYOPHILIZED, FOR SOLUTION INTRAVENOUS at 20:21

## 2017-08-19 RX ADMIN — ALBUTEROL SULFATE 2 PUFF: 90 AEROSOL, METERED RESPIRATORY (INHALATION) at 16:25

## 2017-08-19 RX ADMIN — FAMOTIDINE 20 MG: 20 TABLET ORAL at 20:19

## 2017-08-19 RX ADMIN — SODIUM CHLORIDE: 9 INJECTION, SOLUTION INTRAVENOUS at 17:42

## 2017-08-19 RX ADMIN — PIPERACILLIN SODIUM,TAZOBACTAM SODIUM 4.5 G: 4; .5 INJECTION, POWDER, FOR SOLUTION INTRAVENOUS at 05:06

## 2017-08-19 RX ADMIN — ALBUTEROL SULFATE 2.5 MG: 2.5 SOLUTION RESPIRATORY (INHALATION) at 00:03

## 2017-08-19 RX ADMIN — VASOPRESSIN 0.03 UNITS/MIN: 20 INJECTION INTRAVENOUS at 05:00

## 2017-08-19 RX ADMIN — HYDROCODONE BITARTRATE AND ACETAMINOPHEN 2 TABLET: 10; 325 TABLET ORAL at 11:48

## 2017-08-19 RX ADMIN — HYDROCODONE BITARTRATE AND ACETAMINOPHEN 2 TABLET: 10; 325 TABLET ORAL at 18:53

## 2017-08-19 RX ADMIN — SODIUM CHLORIDE: 9 INJECTION, SOLUTION INTRAVENOUS at 03:33

## 2017-08-19 RX ADMIN — HYDROCODONE BITARTRATE AND ACETAMINOPHEN 2 TABLET: 10; 325 TABLET ORAL at 03:23

## 2017-08-19 RX ADMIN — HEPARIN SODIUM 5000 UNITS: 5000 INJECTION, SOLUTION INTRAVENOUS; SUBCUTANEOUS at 14:25

## 2017-08-19 RX ADMIN — POTASSIUM CHLORIDE 20 MEQ: 14.9 INJECTION, SOLUTION INTRAVENOUS at 09:44

## 2017-08-19 RX ADMIN — HEPARIN SODIUM 5000 UNITS: 5000 INJECTION, SOLUTION INTRAVENOUS; SUBCUTANEOUS at 08:47

## 2017-08-19 ASSESSMENT — PAIN SCALES - GENERAL
PAINLEVEL_OUTOF10: 7
PAINLEVEL_OUTOF10: 8
PAINLEVEL_OUTOF10: 8
PAINLEVEL_OUTOF10: 10
PAINLEVEL_OUTOF10: 6

## 2017-08-19 ASSESSMENT — PATIENT HEALTH QUESTIONNAIRE - PHQ9
SUM OF ALL RESPONSES TO PHQ QUESTIONS 1-9: 0
SUM OF ALL RESPONSES TO PHQ9 QUESTIONS 1 AND 2: 0
1. LITTLE INTEREST OR PLEASURE IN DOING THINGS: NOT AT ALL

## 2017-08-19 NOTE — PROGRESS NOTES
[facility] Hospitalist Progress Note    Date of Service: 2017    Chief Complaint  46/M with psoriasis on MTX, GERD, admitted for ALOC, with ongoing N/V, with fevers and chills, along with cough. Noted to be hypotensive at an outside facility, minimally improved with IVF and started on levophed and transferred to [facility]. WBC 34.8. Lactate 6.8. Na 133. K 3.4. CXR showed dense right upper lobe opacity. Felt to have pneumonia. Started on IVF, antibiotics (zosyn, vancomycin), and pressors.       Interval Problem Update  2017 - no overnight events. Remains hemodynamically stable and afebrile. Stable on RA.  WBC 34.4. K 3.2. Na 139, crea improved to 1.42. Calcium 7.2, albumin 2.9.  Chest CT showed right upper lobe airspace opacity. CXR today showed unchanged RUL consolidation (personally reviewed). PCT 73.39. Blood cultures negative. C diff negative. MRSA ICS negative. Pending sputum GS/culture. Echo showed EF 45%, dyskinetic inferior and septal walls, grade I diastolic dysfunction, right ventricle was normal in size and function, with no significant valve disease or flow abnormalities.     > Seen and examined. Off dopamine, addi and vasopressin, and now just on levo. (+) SOB. (+) occasional cough. No vomiting, abd pain. (+) nausea. (+) diarrhea.       Consultants/Specialty  None    Disposition  Monitor in the ICU.    Time spent: 45 minutes CRITICAL CARE TIME, including managing medical issues, coordination of care, not including doing procedures, no overlap.        ROS     Pertinent positives/negatives as mentioned above.     A complete review of systems was done. All other systems were negative.      Physical Exam  Laboratory/Imaging   Hemodynamics  No data recorded.   Monitored Temp: 36.7 °C (98.1 °F)  Pulse  Av.4  Min: 77  Max: 132 Heart Rate (Monitored): 94  NIBP: (!) 99/69 mmHg CVP (mm Hg): (!) 18 MM HG    Respiratory      Respiration: (!) 21, Pulse Oximetry: 98 %, O2 Daily Delivery Respiratory : Silicone  Nasal Cannula     Given By:: Mask, Work Of Breathing / Effort: Mild  RUL Breath Sounds: Expiratory Wheezes, RML Breath Sounds: Diminished, RLL Breath Sounds: Diminished, FREDO Breath Sounds: Expiratory Wheezes, LLL Breath Sounds: Diminished    Fluids    Intake/Output Summary (Last 24 hours) at 08/19/17 0823  Last data filed at 08/19/17 0600   Gross per 24 hour   Intake 6105.86 ml   Output   5000 ml   Net 1105.86 ml       Nutrition  Orders Placed This Encounter   Procedures   • DIET ORDER     Standing Status: Standing      Number of Occurrences: 1      Standing Expiration Date:      Order Specific Question:  Diet:     Answer:  Clear Liquid [10]     Physical Exam   Constitutional: He appears well-developed. No distress.   HENT:   Head: Normocephalic and atraumatic.   Mouth/Throat: Oropharynx is clear and moist. No oropharyngeal exudate.   Moist lips/oral mucosa.   Eyes: EOM are normal. Pupils are equal, round, and reactive to light. Right eye exhibits no discharge. Left eye exhibits no discharge. No scleral icterus.   Neck: Normal range of motion. Neck supple. No thyromegaly present.   Cardiovascular: Normal rate and regular rhythm.  Exam reveals no gallop and no friction rub.    No murmur heard.  Pulmonary/Chest: Effort normal. He has no wheezes. He exhibits no tenderness.   Crackles on the RUL. Diminished air entry B/L bases.   Abdominal: Soft. Bowel sounds are normal. There is tenderness (less and improved). There is no rebound and no guarding.   Musculoskeletal: Normal range of motion. He exhibits no edema or tenderness.   Lymphadenopathy:     He has no cervical adenopathy.   Neurological: He is alert.   Skin: Skin is warm and dry. No rash noted. He is not diaphoretic. No erythema.   Psychiatric: He has a normal mood and affect. His behavior is normal. Thought content normal.   Vitals reviewed.      Recent Labs      08/17/17   2215  08/18/17   0410  08/19/17   0252   WBC  37.7*  39.6*  34.4*   RBC  4.98  4.54*   3.73*   HEMOGLOBIN  15.1  13.7*  11.4*   HEMATOCRIT  45.0  41.2*  34.1*   MCV  90.4  90.7  91.4   MCH  30.3  30.2  30.6   MCHC  33.6*  33.3*  33.4*   RDW  47.7  49.1  50.9*   PLATELETCT  333  307  234   MPV  10.7  9.7  9.5     Recent Labs      08/17/17   2215  08/18/17   0410  08/18/17   1020  08/19/17   0252   SODIUM  133*  140   --   139   POTASSIUM  3.4*  3.3*  4.5  4.4  3.2*   CHLORIDE  104  111   --   114*   CO2  14*  13*   --   18*   GLUCOSE  96  111*   --   163*   BUN  22  27*   --   23*   CREATININE  2.45*  2.05*   --   1.42*   CALCIUM  7.4*  7.4*   --   7.2*     Recent Labs      08/17/17 2215   APTT  29.9   INR  1.27*                  Assessment/Plan     Septic shock due to CAP, immunocompromised state (CMS-HCC) (present on admission)  Assessment & Plan  - This is severe sepsis with the following associated acute organ dysfunction(s): acute kidney failure, acute respiratory failure.  - continue levophed to keep MAP>65, and SBP>90, will wean as able.   - continue IVF NS at 150cc/hr.   - continue vancomycin and zosyn for now. Trend procalcitonin. Follow blood cultures.   - Await C diff PCR.   - continue to closely monitor hemodynamics in the ICU.     Right lower lobe pneumonia (present on admission)  Assessment & Plan  - in setting of immunocompromised state.   - continue vancomycin and zosyn. Continue to trend procalcitonin. Await sputum GS/Culture.   - will need repeat chest imaging to document resolution of infiltrates.    Immunocompromised status associated with infection (CMS-[facility]) (present on admission)  Assessment & Plan  - holding methotrexate for now.     Acute renal failure (ARF) due to severe sepsis/septic shock (CMS-[facility]) (present on admission)  Assessment & Plan  - much improved with IVF. Continue IVF rehydration. Continue to closely follow renal function. Continue close I&O monitoring.     Elevated troponin level due to demand ischemia from sepsis (present on admission)  Assessment & Plan  -  likely demand ischemia from sepsis. Wall abnormalities on echo likely from sepsis. No chest pains.   - start ASA and lipitor. Check lipid profile. Will get MPI once sepsis resolves.   - continue cardiac monitoring. Monitor for CP.     Hypovolemic hyponatremia (present on admission)  Assessment & Plan  - improved with IVF. Due to sepsis.   - Continue IVF. BMP in AM.     Hypokalemia (present on admission)  Assessment & Plan  - replace with 40mEq IV KCl. Check Mg. BMP in AM.    Acute respiratory failure with hypoxia due to sepsis, CAP (CMS-HCC) (present on admission)  Assessment & Plan  - continue BD per RT protocol. Continue O2 support.   - continue antibiotics for CAP.     Psoriasis (present on admission)  Assessment & Plan  - hold methotrexate for now due to severe sepsis. Continue topicals.    History of tobacco abuse (present on admission)  Assessment & Plan  - Currently in remission.    Marijuana use, continuous (present on admission)  Assessment & Plan  - will need counseling once more stable.     History of alcohol abuse (present on admission)  Assessment & Plan  - remains sober x 10 years now.    Gastric ulcer (present on admission)  Assessment & Plan  - continue PPI. Avoid NSAIDS.    Chronic back pain (present on admission)  Assessment & Plan  - hold narcotics for now due to septic shock, and NSAIDs due to PURA. Continue PRN tylenol for now.       Labs reviewed, Medications reviewed and Radiology images reviewed  Vidales catheter: No Vidales      DVT Prophylaxis: Heparin    Ulcer prophylaxis: Yes  Antibiotics: Treating active infection/contamination beyond 24 hours perioperative coverage  Assessed for rehab: Patient unable to tolerate rehabilitation therapeutic regimen

## 2017-08-19 NOTE — CARE PLAN
Problem: Knowledge Deficit  Goal: Knowledge of disease process/condition, treatment plan, diagnostic tests, and medications will improve  Outcome: PROGRESSING AS EXPECTED  Educated patient related to cultures, antibiotics treatment, sepsis and pressors.    Problem: Fluid Volume:  Goal: Will maintain balanced intake and output  Outcome: PROGRESSING AS EXPECTED  Patient producing adequate urine output

## 2017-08-19 NOTE — FLOWSHEET NOTE
08/19/17 0948   Interdisciplinary Plan of Care-Outcomes    Bronchodilator Outcome Patient at Stable Baseline   Education   Education Yes - Pt. / Family has been Instructed in use of Respiratory Medications and Adverse Reactions   RT Assessment of Delivered Medications   Evaluation of Medication Delivery Daily Yes-- Pt /Family has been Instructed in use of Respiratory Medications and Adverse Reactions   MDI/DPI Group   #MDI/DPI Given MDI/DPI x 1   Respiratory WDL   Respiratory (WDL) X   Chest Exam   Respiration 17   Pulse 100   Heart Rate (Monitored) 77   Breath Sounds   RUL Breath Sounds Expiratory Wheezes   RML Breath Sounds Diminished   RLL Breath Sounds Diminished   FREDO Breath Sounds Diminished   LLL Breath Sounds Diminished   Oximetry   Continuous Oximetry Yes   Oxygen   Pulse Oximetry 98 %   O2 (LPM) 4   O2 Daily Delivery Respiratory  Silicone Nasal Cannula

## 2017-08-19 NOTE — PROGRESS NOTES
"Pharmacy Kinetics 46 y.o. male on vancomycin day # 2 2017    Vancomycin 1000 mg IV given  1754 at previous facility  Vancomycin 1300 mg IV given  2315 to complete loading dose (total 2300 mg)  Vancomycin 1400 mg IV given  0810 after therapeutic level of 15.9 mcg/ml    Indication for Treatment: Septic shock due to RLL pneumonia, immunocompromised state    Pertinent history per medical record: Admitted on 2017 for Septic shock with respiratory failure and ALOC, ongoing N/V, fever, chills, and cough requiring vasopressors, acute renal failure, elevated troponin, hypovolemia hyponatremia, hypokalemia, .  PMH: Psoriasis on MTX, GERD, tobacco use, marijuana use, EtOH abuse, chronic back pain    Other antibiotics: Zosyn 4.5 gm IV Q6H    Allergies: Wellbutrin and Cortisone     List concerns for renal function: obesity, malnutrition/low albumin, hypermetabolic state (SIRS), pressors/hypotension, nephrotoxic drugs (Vanc with Zosyn)    Pertinent cultures to date:    BC periph x 2 Neg   C Diff Neg, NAP1-B1 Neg    Recent Labs      17   2215  17   0410   WBC  37.7*  39.6*   NEUTSPOLYS  62.00   --    BANDSSTABS  22.00*   --      Recent Labs      17   2215  17   0410   BUN  22  27*   CREATININE  2.45*  2.05*   ALBUMIN  2.9*   --      Recent Labs      17   0630  17   1625   VANCORANDOM  15.9  20.9     Intake/Output Summary (Last 24 hours) at 17 1741  Last data filed at 17 1600   Gross per 24 hour   Intake 00102.33 ml   Output   3935 ml   Net 9531.33 ml      Pulse 90, temperature 37.2 °C (99 °F), resp. rate 21, height 1.727 m (5' 8\"), weight 93.8 kg (206 lb 12.7 oz), SpO2 95 %. Temp (24hrs), Av.4 °C (99.3 °F), Min:37.2 °C (99 °F), Max:37.5 °C (99.5 °F)      A/P   1. Vancomycin dose change: 1400 mg IV Q12H starting at 2000 tonight  2. Next vancomycin level: 2-3 days  3. Goal trough: 16 - 20 mcg/ml  4. Comments: Will monitor daily and adjust dose as " needed per protocol.    RAFAEL RuanoD

## 2017-08-19 NOTE — PROGRESS NOTES
Report to Jovanna, Edward. POC reviewed. All lines/drips over viewed with oncoming RNs. Pt has no needs at this time. Wife at bedside.     Spoke with Charla, hospitalist RN. Per Dr. Barker, pt no longer need to trend troponins.

## 2017-08-19 NOTE — PROGRESS NOTES
Kailey from Lab called with critical result of 34.4 WBC at 03:10. Critical lab result read back to kailey.   This critical lab result is within parameters established by  for this patient

## 2017-08-19 NOTE — PROGRESS NOTES
Telemetry Summary    Rhythm Interpretation: Sinus Rhythm with occasional PVC's noted  Heart Rate: 89  NV Interval: 0.12  QRS Interval: 0.06  QT Interval: 0.36

## 2017-08-19 NOTE — PROGRESS NOTES
Late Entry     8/19/17, 0700- Shift report received from off going RN. Patient resting in bed with wife at bedside. Bed in low position with wheels of bed locked and call bell at side. Patient and family advised to call for needs as they arise.     8/19/17, 0715- Assessment completed and charted. Patient aided with repositioning in bed.     8/19/17, 0800- AM medications given as charted in MAR.     8/19/17, 0930- Dopamine and Vasopressin stopped. Levophed started at 10 mcq/min.    8/19/17, 1015- Partial bed bath given, pillow cases and gown changed. IV Potassium infusing as charted in MAR.     8/19/17, 1150- Patient medicated for pain as charted in MAR. Call bell at side, patient advised to call for needs as they arise.

## 2017-08-19 NOTE — FLOWSHEET NOTE
08/19/17 0003   Interdisciplinary Plan of Care-Goals (Indications)   Obstructive Ventilatory Defect or Pulmonary Disease without Obvious Obstruction Strong Subjective / Objective Improvement   Interdisciplinary Plan of Care-Outcomes    Bronchodilator Outcome Patient at Stable Baseline   Education   Education Yes - Pt. / Family has been Instructed in use of Respiratory Medications and Adverse Reactions;Yes - Pt. / Family has been Instructed in use of Respiratory Equipment   RT Assessment of Delivered Medications   Evaluation of Medication Delivery Daily Yes-- Pt /Family has been Instructed in use of Respiratory Medications and Adverse Reactions   SVN Group   #SVN Performed Yes   Given By: Mask   Date SVN Last Changed 08/19/17   Date SVN Next Change Due (Q 7 Days) 08/26/17   Respiratory WDL   Respiratory (WDL) X   Chest Exam   Respiration (!) 25   Pulse 96   Heart Rate (Monitored) (!) 44   Breath Sounds   RUL Breath Sounds Clear   RML Breath Sounds Clear   RLL Breath Sounds Diminished   FREDO Breath Sounds Clear   LLL Breath Sounds Diminished   Oximetry   Continuous Oximetry Yes   Oxygen   Pulse Oximetry 95 %   O2 (LPM) 3   O2 Daily Delivery Respiratory  Silicone Nasal Cannula

## 2017-08-19 NOTE — DISCHARGE PLANNING
Medical Social Work    Referral: Pt discussed at IDT rounds this AM.    Intervention: No SS needs identified nor any requests for CATRINA Simpson during rounds.      Plan: CATRINA available for any assistance with d.c planning.    Care Transition Team Assessment    Information Source  Orientation : Oriented x 4    Readmission Evaluation  Is this a readmission?: No    Elopement Risk  Legal Hold: No  Ambulatory or Self Mobile in Wheelchair: Yes  Disoriented: No  Psychiatric Symptoms: None  History of Wandering: No  Elopement this Admit: No  Vocalizing Wanting to Leave: No  Displays Behaviors, Body Language Wanting to Leave: No-Not at Risk for Elopement  Elopement Risk: Not at Risk for Elopement         Discharge Preparedness  What is your plan after discharge?: Uncertain - pending medical team collaboration  What are your discharge supports?: Partner         Finances  Prescription Coverage: Yes              Advance Directive  Advance Directive?: None         Psychological Assessment  History of Substance Abuse: Alcohol, Marijuana  Date Last Used - Alcohol: 10 years ago  Date Last Used - Marijuana: Daily         Anticipated Discharge Information  Anticipated discharge disposition: Home

## 2017-08-19 NOTE — PROGRESS NOTES
Pt continues to be weaned off pressors. Appears more comfortable this afternoon. Wife remains at bedside.

## 2017-08-19 NOTE — CARE PLAN
Problem: Venous Thromboembolism (VTW)/Deep Vein Thrombosis (DVT) Prevention:  Goal: Patient will participate in Venous Thrombosis (VTE)/Deep Vein Thrombosis (DVT)Prevention Measures    08/19/17 0800   Mechanical/VTE Prophylaxis   Mechanical Prophylaxis  SUSHANT Hose (Graduated Compression Stockings)   SUSHANT Hose (Graduated Compression Stockings) On   OTHER   Risk Assessment Score 4   VTE RISK High   Pharmacologic Prophylaxis Used Unfractionated Heparin         Problem: Pain Management  Goal: Pain level will decrease to patient’s comfort goal  Medications ordered and in place to treat patient complaints of pain on an as needed basis per patient request and nursing assessment.

## 2017-08-19 NOTE — PROGRESS NOTES
"Pharmacy Kinetics 46 y.o. male on vancomycin day # 3 8/19/2017    Currently on Vancomycin 1400 mg iv q12hr    Indication for Treatment: Septic shock due to RLL pneumonia, immunocompromised state    Pertinent history per medical record: Admitted on 8/17/2017 for for Septic shock with respiratory failure and ALOC, ongoing N/V, fever, chills, and cough requiring vasopressors, acute renal failure, elevated troponin, hypovolemia hyponatremia, hypokalemia, .  PMH: Psoriasis on MTX, GERD, tobacco use, marijuana use, EtOH abuse, chronic back pain    Other antibiotics: Zosyn 4.5 gm IV Q6H.    Other antibiotics: Zosyn 4.5 gm IV q6h    Allergies: Wellbutrin and Cortisone     List concerns for renal function (possible concerns include abnormal LFTs, BUN/SCr ratio > 20:1, CHF, obesity, malnutrition/low albumin, hypermetabolic state (SIRS), pressors/hypotension, nephrotoxic drugs, etc.): obesity, malnutrition/low albumin, hypermetabolic state (SIRS), pressors    Pertinent cultures to date:   8/17 BC periph x 2 Neg  8/18 C Diff Neg, NAP1-B1 Neg  8/18 Resp-Nares-Neg for MRSA    Recent Labs      08/17/17   2215  08/18/17   0410  08/19/17   0252   WBC  37.7*  39.6*  34.4*   NEUTSPOLYS  62.00   --    --    BANDSSTABS  22.00*   --    --      Recent Labs      08/17/17   2215  08/18/17   0410  08/19/17   0252   BUN  22  27*  23*   CREATININE  2.45*  2.05*  1.42*   ALBUMIN  2.9*   --    --      Recent Labs      08/18/17   0630  08/18/17   1625   VANCORANDOM  15.9  20.9     Intake/Output Summary (Last 24 hours) at 08/19/17 0722  Last data filed at 08/19/17 0600   Gross per 24 hour   Intake 7110.57 ml   Output   5400 ml   Net 1710.57 ml      Pulse 93, temperature 37.2 °C (99 °F), resp. rate 22, height 1.727 m (5' 8\"), weight 93.8 kg (206 lb 12.7 oz), SpO2 97 %. No data recorded.      A/P   1. Vancomycin dose change: None  2. Next vancomycin level: 1-2 days  3. Goal trough: 16-20  4. Comments: Will monitor daily and adjust dose as needed per " protocol.    Anay Canela

## 2017-08-20 ENCOUNTER — APPOINTMENT (OUTPATIENT)
Dept: RADIOLOGY | Facility: MEDICAL CENTER | Age: 47
DRG: 871 | End: 2017-08-20
Attending: HOSPITALIST
Payer: COMMERCIAL

## 2017-08-20 PROBLEM — A41.9 SEPSIS, UNSPECIFIED: Status: ACTIVE | Noted: 2017-08-20

## 2017-08-20 PROBLEM — E78.1 HYPERTRIGLYCERIDEMIA: Status: ACTIVE | Noted: 2017-08-20

## 2017-08-20 PROBLEM — I95.9 HYPOTENSION: Status: ACTIVE | Noted: 2017-08-20

## 2017-08-20 LAB
ANION GAP SERPL CALC-SCNC: 6 MMOL/L (ref 0–11.9)
BACTERIA UR CULT: NORMAL
BUN SERPL-MCNC: 21 MG/DL (ref 8–22)
CALCIUM SERPL-MCNC: 7.4 MG/DL (ref 8.4–10.2)
CHLORIDE SERPL-SCNC: 110 MMOL/L (ref 96–112)
CO2 SERPL-SCNC: 21 MMOL/L (ref 20–33)
CREAT SERPL-MCNC: 1.21 MG/DL (ref 0.5–1.4)
ERYTHROCYTE [DISTWIDTH] IN BLOOD BY AUTOMATED COUNT: 51.2 FL (ref 35.9–50)
GFR SERPL CREATININE-BSD FRML MDRD: >60 ML/MIN/1.73 M 2
GLUCOSE SERPL-MCNC: 89 MG/DL (ref 65–99)
HCT VFR BLD AUTO: 30.7 % (ref 42–52)
HGB BLD-MCNC: 10 G/DL (ref 14–18)
MCH RBC QN AUTO: 29.4 PG (ref 27–33)
MCHC RBC AUTO-ENTMCNC: 32.6 G/DL (ref 33.7–35.3)
MCV RBC AUTO: 90.3 FL (ref 81.4–97.8)
PLATELET # BLD AUTO: 175 K/UL (ref 164–446)
PMV BLD AUTO: 10 FL (ref 9–12.9)
POTASSIUM SERPL-SCNC: 3 MMOL/L (ref 3.6–5.5)
PROCALCITONIN SERPL-MCNC: 72.73 NG/ML
RBC # BLD AUTO: 3.4 M/UL (ref 4.7–6.1)
SIGNIFICANT IND 70042: NORMAL
SITE SITE: NORMAL
SODIUM SERPL-SCNC: 137 MMOL/L (ref 135–145)
SOURCE SOURCE: NORMAL
WBC # BLD AUTO: 23.3 K/UL (ref 4.8–10.8)

## 2017-08-20 PROCEDURE — 700111 HCHG RX REV CODE 636 W/ 250 OVERRIDE (IP): Performed by: HOSPITALIST

## 2017-08-20 PROCEDURE — 700102 HCHG RX REV CODE 250 W/ 637 OVERRIDE(OP): Performed by: INTERNAL MEDICINE

## 2017-08-20 PROCEDURE — 97161 PT EVAL LOW COMPLEX 20 MIN: CPT

## 2017-08-20 PROCEDURE — 85027 COMPLETE CBC AUTOMATED: CPT

## 2017-08-20 PROCEDURE — 80048 BASIC METABOLIC PNL TOTAL CA: CPT

## 2017-08-20 PROCEDURE — 700105 HCHG RX REV CODE 258: Performed by: HOSPITALIST

## 2017-08-20 PROCEDURE — 700102 HCHG RX REV CODE 250 W/ 637 OVERRIDE(OP): Performed by: HOSPITALIST

## 2017-08-20 PROCEDURE — 770020 HCHG ROOM/CARE - TELE (206)

## 2017-08-20 PROCEDURE — 84145 PROCALCITONIN (PCT): CPT

## 2017-08-20 PROCEDURE — 700105 HCHG RX REV CODE 258: Performed by: INTERNAL MEDICINE

## 2017-08-20 PROCEDURE — 700111 HCHG RX REV CODE 636 W/ 250 OVERRIDE (IP): Performed by: INTERNAL MEDICINE

## 2017-08-20 PROCEDURE — 99233 SBSQ HOSP IP/OBS HIGH 50: CPT | Performed by: INTERNAL MEDICINE

## 2017-08-20 PROCEDURE — 94760 N-INVAS EAR/PLS OXIMETRY 1: CPT

## 2017-08-20 PROCEDURE — A9270 NON-COVERED ITEM OR SERVICE: HCPCS | Performed by: HOSPITALIST

## 2017-08-20 PROCEDURE — G8978 MOBILITY CURRENT STATUS: HCPCS | Mod: CL

## 2017-08-20 PROCEDURE — 700101 HCHG RX REV CODE 250: Performed by: INTERNAL MEDICINE

## 2017-08-20 PROCEDURE — A9270 NON-COVERED ITEM OR SERVICE: HCPCS | Performed by: INTERNAL MEDICINE

## 2017-08-20 PROCEDURE — 94640 AIRWAY INHALATION TREATMENT: CPT

## 2017-08-20 PROCEDURE — G8979 MOBILITY GOAL STATUS: HCPCS | Mod: CJ

## 2017-08-20 PROCEDURE — 71010 DX-CHEST-PORTABLE (1 VIEW): CPT

## 2017-08-20 RX ORDER — OMEPRAZOLE 20 MG/1
20 CAPSULE, DELAYED RELEASE ORAL DAILY
Status: DISCONTINUED | OUTPATIENT
Start: 2017-08-20 | End: 2017-08-24 | Stop reason: HOSPADM

## 2017-08-20 RX ORDER — POTASSIUM CHLORIDE 20 MEQ/1
40 TABLET, EXTENDED RELEASE ORAL 2 TIMES DAILY
Status: DISCONTINUED | OUTPATIENT
Start: 2017-08-20 | End: 2017-08-22

## 2017-08-20 RX ORDER — IPRATROPIUM BROMIDE AND ALBUTEROL SULFATE 2.5; .5 MG/3ML; MG/3ML
3 SOLUTION RESPIRATORY (INHALATION)
Status: DISCONTINUED | OUTPATIENT
Start: 2017-08-20 | End: 2017-08-24 | Stop reason: HOSPADM

## 2017-08-20 RX ADMIN — HEPARIN SODIUM 5000 UNITS: 5000 INJECTION, SOLUTION INTRAVENOUS; SUBCUTANEOUS at 06:10

## 2017-08-20 RX ADMIN — ALBUTEROL SULFATE 2 PUFF: 90 AEROSOL, METERED RESPIRATORY (INHALATION) at 18:56

## 2017-08-20 RX ADMIN — PIPERACILLIN SODIUM,TAZOBACTAM SODIUM 4.5 G: 4; .5 INJECTION, POWDER, FOR SOLUTION INTRAVENOUS at 23:21

## 2017-08-20 RX ADMIN — HYDROCODONE BITARTRATE AND ACETAMINOPHEN 2 TABLET: 10; 325 TABLET ORAL at 09:20

## 2017-08-20 RX ADMIN — HEPARIN SODIUM 5000 UNITS: 5000 INJECTION, SOLUTION INTRAVENOUS; SUBCUTANEOUS at 20:13

## 2017-08-20 RX ADMIN — POTASSIUM CHLORIDE 40 MEQ: 1500 TABLET, EXTENDED RELEASE ORAL at 20:13

## 2017-08-20 RX ADMIN — FOLIC ACID 1 MG: 1 TABLET ORAL at 07:59

## 2017-08-20 RX ADMIN — CALCIUM CARBONATE (ANTACID) CHEW TAB 500 MG 500 MG: 500 CHEW TAB at 18:32

## 2017-08-20 RX ADMIN — VANCOMYCIN HYDROCHLORIDE 1400 MG: 10 INJECTION, POWDER, LYOPHILIZED, FOR SOLUTION INTRAVENOUS at 19:24

## 2017-08-20 RX ADMIN — PIPERACILLIN SODIUM,TAZOBACTAM SODIUM 4.5 G: 4; .5 INJECTION, POWDER, FOR SOLUTION INTRAVENOUS at 17:44

## 2017-08-20 RX ADMIN — IPRATROPIUM BROMIDE AND ALBUTEROL SULFATE 3 ML: .5; 3 SOLUTION RESPIRATORY (INHALATION) at 22:41

## 2017-08-20 RX ADMIN — PIPERACILLIN SODIUM,TAZOBACTAM SODIUM 4.5 G: 4; .5 INJECTION, POWDER, FOR SOLUTION INTRAVENOUS at 00:06

## 2017-08-20 RX ADMIN — IPRATROPIUM BROMIDE AND ALBUTEROL SULFATE 3 ML: .5; 3 SOLUTION RESPIRATORY (INHALATION) at 01:01

## 2017-08-20 RX ADMIN — PROMETHAZINE HYDROCHLORIDE 25 MG: 25 TABLET ORAL at 18:30

## 2017-08-20 RX ADMIN — PIPERACILLIN SODIUM,TAZOBACTAM SODIUM 4.5 G: 4; .5 INJECTION, POWDER, FOR SOLUTION INTRAVENOUS at 11:23

## 2017-08-20 RX ADMIN — HEPARIN SODIUM 5000 UNITS: 5000 INJECTION, SOLUTION INTRAVENOUS; SUBCUTANEOUS at 14:28

## 2017-08-20 RX ADMIN — ALBUTEROL SULFATE 2 PUFF: 90 AEROSOL, METERED RESPIRATORY (INHALATION) at 13:08

## 2017-08-20 RX ADMIN — HYDROCODONE BITARTRATE AND ACETAMINOPHEN 2 TABLET: 10; 325 TABLET ORAL at 01:13

## 2017-08-20 RX ADMIN — ACETAMINOPHEN 650 MG: 325 TABLET, FILM COATED ORAL at 19:24

## 2017-08-20 RX ADMIN — PIPERACILLIN SODIUM,TAZOBACTAM SODIUM 4.5 G: 4; .5 INJECTION, POWDER, FOR SOLUTION INTRAVENOUS at 06:06

## 2017-08-20 RX ADMIN — POTASSIUM CHLORIDE 40 MEQ: 1500 TABLET, EXTENDED RELEASE ORAL at 08:00

## 2017-08-20 RX ADMIN — ATORVASTATIN CALCIUM 40 MG: 40 TABLET, FILM COATED ORAL at 20:13

## 2017-08-20 RX ADMIN — SODIUM CHLORIDE: 9 INJECTION, SOLUTION INTRAVENOUS at 03:52

## 2017-08-20 RX ADMIN — HYDROCODONE BITARTRATE AND ACETAMINOPHEN 1 TABLET: 10; 325 TABLET ORAL at 22:23

## 2017-08-20 RX ADMIN — VANCOMYCIN HYDROCHLORIDE 1400 MG: 10 INJECTION, POWDER, LYOPHILIZED, FOR SOLUTION INTRAVENOUS at 08:05

## 2017-08-20 RX ADMIN — ASPIRIN 325 MG ORAL TABLET 325 MG: 325 PILL ORAL at 07:59

## 2017-08-20 RX ADMIN — ALBUTEROL SULFATE 2 PUFF: 90 AEROSOL, METERED RESPIRATORY (INHALATION) at 08:06

## 2017-08-20 RX ADMIN — SODIUM CHLORIDE: 9 INJECTION, SOLUTION INTRAVENOUS at 19:23

## 2017-08-20 RX ADMIN — SODIUM CHLORIDE 1000 ML: 9 INJECTION, SOLUTION INTRAVENOUS at 17:44

## 2017-08-20 RX ADMIN — OMEPRAZOLE 20 MG: 20 CAPSULE, DELAYED RELEASE ORAL at 07:59

## 2017-08-20 RX ADMIN — HYDROCODONE BITARTRATE AND ACETAMINOPHEN 2 TABLET: 10; 325 TABLET ORAL at 15:32

## 2017-08-20 ASSESSMENT — PAIN SCALES - GENERAL
PAINLEVEL_OUTOF10: 7
PAINLEVEL_OUTOF10: 6
PAINLEVEL_OUTOF10: 2
PAINLEVEL_OUTOF10: 10
PAINLEVEL_OUTOF10: 6
PAINLEVEL_OUTOF10: 0

## 2017-08-20 ASSESSMENT — GAIT ASSESSMENTS
DEVIATION: STEP TO
DISTANCE (FEET): 100
GAIT LEVEL OF ASSIST: STAND BY ASSIST
ASSISTIVE DEVICE: FRONT WHEEL WALKER

## 2017-08-20 NOTE — PROGRESS NOTES
[facility] Hospitalist Progress Note    Date of Service: 2017    Chief Complaint  46/M with psoriasis on MTX, GERD, admitted for ALOC, with ongoing N/V, with fevers and chills, along with cough. Noted to be hypotensive at an outside facility, minimally improved with IVF and started on levophed and transferred to [facility]. WBC 34.8. Lactate 6.8. Na 133. K 3.4. CXR showed dense right upper lobe opacity. Felt to have pneumonia. Started on IVF, antibiotics (zosyn, vancomycin), and pressors. Chest CT showed right upper lobe airspace opacity. CXR today showed unchanged RUL consolidation (personally reviewed). PCT 73.39. Blood cultures negative. C diff negative. MRSA ICS negative. Pending sputum GS/culture. Echo showed EF 45%, dyskinetic inferior and septal walls, grade I diastolic dysfunction, right ventricle was normal in size and function, with no significant valve disease or flow abnormalities.         Interval Problem Update  2017 - uneventful night. VSS. Afebrile. Now on RA, saturating well. Now off pressors. WBC improved to 23.3. Hgb 10. K 3.0. Mg 2.0. Lactate still high at 2.80. . HDL 16. LDL 54. Sputum GS with mod WBC, no organisms. CXR showed no interval changes (personally reviewed). C diff negative.     > Seen and examined. Feeling better, still feels weak. (+) SOB but improved. (+) cough. No CP, nausea, vomiting. Diarrhea less frequent, more formed.         Consultants/Specialty  None    Disposition  Transfer to telemetry.         ROS     Pertinent positives/negatives as mentioned above.     A complete review of systems was done. All other systems were negative.      Physical Exam  Laboratory/Imaging   Hemodynamics  No data recorded.   Monitored Temp: 37 °C (98.6 °F)  Pulse  Av.4  Min: 77  Max: 132 Heart Rate (Monitored): 79  NIBP: 125/97 mmHg CVP (mm Hg): (!) 24 MM HG    Respiratory      Respiration: 13, Pulse Oximetry: 96 %, O2 Daily Delivery Respiratory : Silicone Nasal Cannula     Given By::  Mask, #MDI/DPI Given: MDI/DPI x 1, Work Of Breathing / Effort: Mild  RUL Breath Sounds: Expiratory Wheezes, RML Breath Sounds: Diminished, RLL Breath Sounds: Expiratory Wheezes, FREDO Breath Sounds: Diminished, LLL Breath Sounds: Diminished    Fluids    Intake/Output Summary (Last 24 hours) at 08/20/17 0654  Last data filed at 08/20/17 0600   Gross per 24 hour   Intake 4163.32 ml   Output   1745 ml   Net 2418.32 ml       Nutrition  Orders Placed This Encounter   Procedures   • DIET ORDER     Standing Status: Standing      Number of Occurrences: 1      Standing Expiration Date:      Order Specific Question:  Diet:     Answer:  Clear Liquid [10]     Physical Exam   Constitutional: He appears well-developed. No distress.   HENT:   Head: Normocephalic and atraumatic.   Mouth/Throat: Oropharynx is clear and moist. No oropharyngeal exudate.   Moist lips/oral mucosa.   Eyes: EOM are normal. Pupils are equal, round, and reactive to light. Right eye exhibits no discharge. Left eye exhibits no discharge. No scleral icterus.   Neck: Normal range of motion. Neck supple. No thyromegaly present.   Cardiovascular: Normal rate and regular rhythm.  Exam reveals no gallop and no friction rub.    No murmur heard.  Pulmonary/Chest: Effort normal. He has no wheezes. He exhibits no tenderness.   Diminished air entry B/L bases, otherwise CTA.   Abdominal: Soft. Bowel sounds are normal. There is no tenderness. There is no rebound and no guarding.   Musculoskeletal: Normal range of motion. He exhibits no edema or tenderness.   Lymphadenopathy:     He has no cervical adenopathy.   Neurological: He is alert.   Skin: Skin is warm and dry. No rash noted. He is not diaphoretic. No erythema.   Psychiatric: He has a normal mood and affect. His behavior is normal. Thought content normal.   Vitals reviewed.      Recent Labs      08/18/17   0410  08/19/17   0252  08/20/17   0245   WBC  39.6*  34.4*  23.3*   RBC  4.54*  3.73*  3.40*   HEMOGLOBIN  13.7*   11.4*  10.0*   HEMATOCRIT  41.2*  34.1*  30.7*   MCV  90.7  91.4  90.3   MCH  30.2  30.6  29.4   MCHC  33.3*  33.4*  32.6*   RDW  49.1  50.9*  51.2*   PLATELETCT  307  234  175   MPV  9.7  9.5  10.0     Recent Labs      08/18/17   0410  08/18/17   1020  08/19/17   0252  08/20/17   0245   SODIUM  140   --   139  137   POTASSIUM  4.5  4.4  3.2*  3.0*   CHLORIDE  111   --   114*  110   CO2  13*   --   18*  21   GLUCOSE  111*   --   163*  89   BUN  27*   --   23*  21   CREATININE  2.05*   --   1.42*  1.21   CALCIUM  7.4*   --   7.2*  7.4*     Recent Labs      08/17/17   2215   APTT  29.9   INR  1.27*         Recent Labs      08/19/17   1435   TRIGLYCERIDE  168*   HDL  16*   LDL  54          Assessment/Plan     Septic shock due to CAP, immunocompromised state (CMS-HCC) (present on admission)  Assessment & Plan  - This is severe sepsis with the following associated acute organ dysfunction(s): acute kidney failure, acute respiratory failure.  - off pressors. Continue IVF NS at 150cc/hr.   - continue vancomycin and zosyn for now. Continue to follow procalcitonin. Follow blood and sputum cultures, and deescalate antibiotics.   - may transfer out of ICU.    Right lower lobe pneumonia (present on admission)  Assessment & Plan  - in setting of immunocompromised state.   - continue vancomycin and zosyn. Continue to trend procalcitonin. Follow sputum GS/Culture, deescalate antibiotics once we have cultures back.   - will need repeat chest imaging in 4-6 weeks to document resolution of infiltrates.    Immunocompromised status associated with infection (CMS-[facility]) (present on admission)  Assessment & Plan  - continue to hold methotrexate for now.     Acute renal failure (ARF) due to severe sepsis/septic shock (CMS-[facility]) (present on admission)  Assessment & Plan  - resolved.   - maintain on IVF for now. Continue to monitor renal function. BMP in AM.    Elevated troponin level due to demand ischemia from sepsis (present on  admission)  Assessment & Plan  - likely demand ischemia from sepsis. Wall abnormalities on echo likely from sepsis.   - continue ASA and lipitor. I spoke with Dr. Card, who will graciously consult. Will likely need MPI down the road once acute issues stabilizes.   - continue cardiac monitoring. Monitor for CP.     Hypovolemic hyponatremia (present on admission)  Assessment & Plan  - resolved. Due to sepsis.   - Continue IVF. Continue to monitor - BMP in AM.     Hypokalemia (present on admission)  Assessment & Plan  - start kdur 40mEq PO BID. Mg WNL. Repeat BMP in AM.    Acute respiratory failure with hypoxia due to sepsis, CAP (CMS-HCC) (present on admission)  Assessment & Plan  - continue BD per RT protocol. Continue O2 support.   - continue antibiotics for CAP.     Psoriasis (present on admission)  Assessment & Plan  - continue to hold methotrexate for now. Continue topicals.    History of tobacco abuse (present on admission)  Assessment & Plan  - Currently in remission.    Marijuana use, continuous (present on admission)  Assessment & Plan  - uses medical marijuana.    History of alcohol abuse (present on admission)  Assessment & Plan  - remains sober x 10 years now.    Gastric ulcer (present on admission)  Assessment & Plan  - continue PPI. Avoid NSAIDS.    Chronic back pain (present on admission)  Assessment & Plan  - continue to hold narcotics for now due to tenuous hemodynamics, but if BP remains stable will resume MS contin in AM. Continue PRN norco.       Hypertriglyceridemia (present on admission)  Assessment & Plan  - continue lipitor.       Labs reviewed, Medications reviewed and Radiology images reviewed  Vidales catheter: No Vidales      DVT Prophylaxis: Heparin    Ulcer prophylaxis: Yes  Antibiotics: Treating active infection/contamination beyond 24 hours perioperative coverage  Assessed for rehab: Patient unable to tolerate rehabilitation therapeutic regimen

## 2017-08-20 NOTE — PROGRESS NOTES
Telemetry Summary    Rhythm Interpretation: Sinus Rythm  Heart Rate: 95  NM Interval: 0.12  QRS Interval: 0.08  QT Interval: 0.32

## 2017-08-20 NOTE — PROGRESS NOTES
Late Entry    8/20/17, 0700- Shift report received from off going RN. Patient resting in bed, no distress noted at this time. Patient wife at bedside. Call bell at side and patient advised to call for needs as they arise.     8/20/17, 0815- Patient able to tolerate portions of liquid breakfast. AM medications given as charted in MAR.     8/20/17, 0840- Patient placed on Telemetry monitor as patient given discharge order for home.     8/20/17, 0920- Patient with complaints of pain to back treated with Norco 2 tabs as charted in MAR.      8/20/17, 1030- Patient up with staff assistance to restroom previously and currently resting back to bed.    8/20/17, 1400- Patient patient resting in bed.     8/20/17, 1615- Shift report given to next shift RN. Patient being transferred from room 322 to room 311 bed 2.

## 2017-08-20 NOTE — THERAPY
"Physical Therapy Evaluation completed.   Bed Mobility:  Supine to Sit: Modified Independent  Transfers: Sit to Stand: Stand by Assist  Gait: Level Of Assist: Stand by Assist with Front-Wheel Walker     X 100 feet  Plan of Care: Will benefit from Physical Therapy 7 times per week  Discharge Recommendations: Equipment: Will Continue to Assess for Equipment Needs Post-acute therapy Currently anticipate no further skilled therapy needs once patient is discharged from the inpatient setting.    See \"Rehab Therapy-Acute\" Patient Summary Report for complete documentation.     "

## 2017-08-20 NOTE — PROGRESS NOTES
Report received from FORREST Steward. Pt resting in bed in no apparent distress, wife at bedside. POC discussed. Pt medicated per MAR for pain with PRN Norco, given tums per request. Pt states no further needs at this time and is instructed to call Rn with any. Will continue with care.

## 2017-08-20 NOTE — PROGRESS NOTES
"Pharmacy Kinetics 46 y.o. male on vancomycin day # 4 8/20/2017    Currently on Vancomycin 1400 mg iv q12hr    Indication for Treatment: Septic shock due to RLL pneumonia, immunocompromised state    Pertinent history per medical record: Admitted on 8/17/2017 for for Septic shock with respiratory failure and ALOC, ongoing N/V, fever, chills, and cough requiring vasopressors, acute renal failure, elevated troponin, hypovolemia hyponatremia, hypokalemia, .  PMH: Psoriasis on MTX, GERD, tobacco use, marijuana use, EtOH abuse, chronic back pain    Other antibiotics: Zosyn 4.5 gm IV Q6H..    Allergies: Wellbutrin and Cortisone     List concerns for renal function (possible concerns include abnormal LFTs, BUN/SCr ratio > 20:1, CHF, obesity, malnutrition/low albumin, hypermetabolic state (SIRS), pressors/hypotension, nephrotoxic drugs, etc.): obesity, malnutrition/low albumin, hypermetabolic state (SIRS), pressors    Pertinent cultures to date:   8/17 BC periph x 2 Neg  8/18 C Diff Neg, NAP1-B1 Neg  8/18 Resp-Nares-Neg for MRSA    Recent Labs      08/17/17   2215  08/18/17   0410  08/19/17   0252  08/20/17   0245   WBC  37.7*  39.6*  34.4*  23.3*   NEUTSPOLYS  62.00   --    --    --    BANDSSTABS  22.00*   --    --    --      Recent Labs      08/17/17   2215  08/18/17   0410  08/19/17   0252  08/20/17   0245   BUN  22  27*  23*  21   CREATININE  2.45*  2.05*  1.42*  1.21   ALBUMIN  2.9*   --    --    --      Recent Labs      08/18/17   0630  08/18/17   1625   VANCORANDOM  15.9  20.9     Intake/Output Summary (Last 24 hours) at 08/20/17 0705  Last data filed at 08/20/17 0600   Gross per 24 hour   Intake 4163.32 ml   Output   1745 ml   Net 2418.32 ml      Pulse 80, temperature 37.2 °C (99 °F), resp. rate 13, height 1.727 m (5' 8\"), weight 98 kg (216 lb 0.8 oz), SpO2 96 %. No data recorded.      A/P   1. Vancomycin dose change: None  2. Next vancomycin level: 8/21 07:30  3. Goal trough: 12-16  4. Comments: Will monitor dailys " and adjust dose as needed per protocol.    Anay Canela

## 2017-08-20 NOTE — CARE PLAN
Problem: Safety  Goal: Will remain free from injury  Patient alert and oriented. Patient bed in low position with wheels of bed locked and call bell at side. Patient noted to call with any ambulation needs as they arise.     Problem: Venous Thromboembolism (VTW)/Deep Vein Thrombosis (DVT) Prevention:  Goal: Patient will participate in Venous Thrombosis (VTE)/Deep Vein Thrombosis (DVT)Prevention Measures    08/19/17 2000   Mechanical/VTE Prophylaxis   Mechanical Prophylaxis  SUSHANT Hose (Graduated Compression Stockings)   SUSHANT Hose (Graduated Compression Stockings) On   OTHER   Risk Assessment Score 3   VTE RISK High   Pharmacologic Prophylaxis Used Unfractionated Heparin         Problem: Knowledge Deficit  Goal: Knowledge of disease process/condition, treatment plan, diagnostic tests, and medications will improve  The plan of care for today discussed with patient and family (rest, probable downgrade from ICU status to medical status today, ambulation with staff)

## 2017-08-20 NOTE — FLOWSHEET NOTE
08/20/17 0101   Events/Summary/Plan   Events/Summary/Plan Pt complaing of SOB at this time with mild expiratory wheezing, SVn given at this time pt reports improvement in breathing and breath sounds had increased aeration with mild wheezing on right lung fields   Interdisciplinary Plan of Care-Goals (Indications)   Obstructive Ventilatory Defect or Pulmonary Disease without Obvious Obstruction Strong Subjective / Objective Improvement   Interdisciplinary Plan of Care-Outcomes    Bronchodilator Outcome Patient at Stable Baseline   Education   Education Yes - Pt. / Family has been Instructed in use of Respiratory Equipment;Yes - Pt. / Family has been Instructed in use of Respiratory Medications and Adverse Reactions   RT Assessment of Delivered Medications   Evaluation of Medication Delivery Daily Yes-- Pt /Family has been Instructed in use of Respiratory Medications and Adverse Reactions   SVN Group   #SVN Performed Yes   Given By: Mask   Respiratory WDL   Respiratory (WDL) X   Chest Exam   Work Of Breathing / Effort Mild   Respiration 15   Pulse 91   Heart Rate (Monitored) 92   Breath Sounds   Pre/Post Intervention Pre Intervention Assessment   RUL Breath Sounds Expiratory Wheezes   RML Breath Sounds Diminished   RLL Breath Sounds Diminished   FREDO Breath Sounds Expiratory Wheezes   LLL Breath Sounds Diminished   Oximetry   Continuous Oximetry Yes   Oxygen   Pulse Oximetry 97 %   O2 (LPM) 4   O2 Daily Delivery Respiratory  Silicone Nasal Cannula

## 2017-08-21 ENCOUNTER — APPOINTMENT (OUTPATIENT)
Dept: RADIOLOGY | Facility: MEDICAL CENTER | Age: 47
DRG: 871 | End: 2017-08-21
Attending: HOSPITALIST
Payer: COMMERCIAL

## 2017-08-21 PROBLEM — B37.0 ORAL THRUSH: Status: ACTIVE | Noted: 2017-08-21

## 2017-08-21 LAB
ANION GAP SERPL CALC-SCNC: 5 MMOL/L (ref 0–11.9)
BACTERIA SPEC RESP CULT: ABNORMAL
BACTERIA SPEC RESP CULT: ABNORMAL
BUN SERPL-MCNC: 18 MG/DL (ref 8–22)
CALCIUM SERPL-MCNC: 8.1 MG/DL (ref 8.4–10.2)
CHLORIDE SERPL-SCNC: 113 MMOL/L (ref 96–112)
CO2 SERPL-SCNC: 23 MMOL/L (ref 20–33)
CREAT SERPL-MCNC: 0.99 MG/DL (ref 0.5–1.4)
ERYTHROCYTE [DISTWIDTH] IN BLOOD BY AUTOMATED COUNT: 49.9 FL (ref 35.9–50)
GFR SERPL CREATININE-BSD FRML MDRD: >60 ML/MIN/1.73 M 2
GLUCOSE SERPL-MCNC: 92 MG/DL (ref 65–99)
GRAM STN SPEC: ABNORMAL
HCT VFR BLD AUTO: 33.5 % (ref 42–52)
HGB BLD-MCNC: 11.3 G/DL (ref 14–18)
MCH RBC QN AUTO: 30.1 PG (ref 27–33)
MCHC RBC AUTO-ENTMCNC: 33.7 G/DL (ref 33.7–35.3)
MCV RBC AUTO: 89.1 FL (ref 81.4–97.8)
PLATELET # BLD AUTO: 187 K/UL (ref 164–446)
PMV BLD AUTO: 10.5 FL (ref 9–12.9)
POTASSIUM SERPL-SCNC: 4.1 MMOL/L (ref 3.6–5.5)
PROCALCITONIN SERPL-MCNC: 33.68 NG/ML
RBC # BLD AUTO: 3.76 M/UL (ref 4.7–6.1)
SIGNIFICANT IND 70042: ABNORMAL
SITE SITE: ABNORMAL
SODIUM SERPL-SCNC: 141 MMOL/L (ref 135–145)
SOURCE SOURCE: ABNORMAL
VANCOMYCIN TROUGH SERPL-MCNC: 23.3 UG/ML (ref 10–20)
WBC # BLD AUTO: 13.3 K/UL (ref 4.8–10.8)

## 2017-08-21 PROCEDURE — 700101 HCHG RX REV CODE 250: Performed by: INTERNAL MEDICINE

## 2017-08-21 PROCEDURE — 770020 HCHG ROOM/CARE - TELE (206)

## 2017-08-21 PROCEDURE — G8988 SELF CARE GOAL STATUS: HCPCS | Mod: CJ

## 2017-08-21 PROCEDURE — A9270 NON-COVERED ITEM OR SERVICE: HCPCS | Performed by: INTERNAL MEDICINE

## 2017-08-21 PROCEDURE — 700102 HCHG RX REV CODE 250 W/ 637 OVERRIDE(OP): Performed by: INTERNAL MEDICINE

## 2017-08-21 PROCEDURE — G8987 SELF CARE CURRENT STATUS: HCPCS | Mod: CK

## 2017-08-21 PROCEDURE — 700111 HCHG RX REV CODE 636 W/ 250 OVERRIDE (IP): Performed by: HOSPITALIST

## 2017-08-21 PROCEDURE — 97535 SELF CARE MNGMENT TRAINING: CPT

## 2017-08-21 PROCEDURE — 71010 DX-CHEST-PORTABLE (1 VIEW): CPT

## 2017-08-21 PROCEDURE — 700102 HCHG RX REV CODE 250 W/ 637 OVERRIDE(OP): Performed by: HOSPITALIST

## 2017-08-21 PROCEDURE — 700105 HCHG RX REV CODE 258: Performed by: HOSPITALIST

## 2017-08-21 PROCEDURE — A9270 NON-COVERED ITEM OR SERVICE: HCPCS | Performed by: HOSPITALIST

## 2017-08-21 PROCEDURE — 94640 AIRWAY INHALATION TREATMENT: CPT

## 2017-08-21 PROCEDURE — 84145 PROCALCITONIN (PCT): CPT

## 2017-08-21 PROCEDURE — 94760 N-INVAS EAR/PLS OXIMETRY 1: CPT

## 2017-08-21 PROCEDURE — 700111 HCHG RX REV CODE 636 W/ 250 OVERRIDE (IP): Performed by: INTERNAL MEDICINE

## 2017-08-21 PROCEDURE — 80202 ASSAY OF VANCOMYCIN: CPT

## 2017-08-21 PROCEDURE — 99233 SBSQ HOSP IP/OBS HIGH 50: CPT | Performed by: INTERNAL MEDICINE

## 2017-08-21 PROCEDURE — 85027 COMPLETE CBC AUTOMATED: CPT

## 2017-08-21 PROCEDURE — 97165 OT EVAL LOW COMPLEX 30 MIN: CPT

## 2017-08-21 PROCEDURE — 80048 BASIC METABOLIC PNL TOTAL CA: CPT

## 2017-08-21 RX ORDER — FLUCONAZOLE 200 MG/1
200 TABLET ORAL DAILY
Status: DISCONTINUED | OUTPATIENT
Start: 2017-08-21 | End: 2017-08-24 | Stop reason: HOSPADM

## 2017-08-21 RX ORDER — AMOXICILLIN AND CLAVULANATE POTASSIUM 875; 125 MG/1; MG/1
1 TABLET, FILM COATED ORAL EVERY 12 HOURS
Status: DISCONTINUED | OUTPATIENT
Start: 2017-08-21 | End: 2017-08-24 | Stop reason: HOSPADM

## 2017-08-21 RX ORDER — MORPHINE SULFATE 30 MG/1
30 TABLET, FILM COATED, EXTENDED RELEASE ORAL EVERY 12 HOURS
Status: DISCONTINUED | OUTPATIENT
Start: 2017-08-21 | End: 2017-08-24 | Stop reason: HOSPADM

## 2017-08-21 RX ORDER — LIDOCAINE AND PRILOCAINE 25; 25 MG/G; MG/G
CREAM TOPICAL PRN
Status: DISCONTINUED | OUTPATIENT
Start: 2017-08-21 | End: 2017-08-24 | Stop reason: HOSPADM

## 2017-08-21 RX ORDER — L. ACIDOPHILUS/L.BULGARICUS 100MM CELL
1 GRANULES IN PACKET (EA) ORAL
Status: DISCONTINUED | OUTPATIENT
Start: 2017-08-21 | End: 2017-08-24 | Stop reason: HOSPADM

## 2017-08-21 RX ORDER — AZITHROMYCIN 250 MG/1
500 TABLET, FILM COATED ORAL DAILY
Status: DISCONTINUED | OUTPATIENT
Start: 2017-08-21 | End: 2017-08-24 | Stop reason: HOSPADM

## 2017-08-21 RX ORDER — FUROSEMIDE 40 MG/1
40 TABLET ORAL
Status: DISCONTINUED | OUTPATIENT
Start: 2017-08-21 | End: 2017-08-24 | Stop reason: HOSPADM

## 2017-08-21 RX ADMIN — OMEPRAZOLE 20 MG: 20 CAPSULE, DELAYED RELEASE ORAL at 08:43

## 2017-08-21 RX ADMIN — NYSTATIN 500000 UNITS: 500000 SUSPENSION ORAL at 19:32

## 2017-08-21 RX ADMIN — ONDANSETRON 4 MG: 4 TABLET, ORALLY DISINTEGRATING ORAL at 17:54

## 2017-08-21 RX ADMIN — FOLIC ACID 1 MG: 1 TABLET ORAL at 08:43

## 2017-08-21 RX ADMIN — ONDANSETRON 4 MG: 4 TABLET, ORALLY DISINTEGRATING ORAL at 08:43

## 2017-08-21 RX ADMIN — AMOXICILLIN AND CLAVULANATE POTASSIUM 1 TABLET: 875; 125 TABLET, FILM COATED ORAL at 20:38

## 2017-08-21 RX ADMIN — NYSTATIN 500000 UNITS: 500000 SUSPENSION ORAL at 20:38

## 2017-08-21 RX ADMIN — ASPIRIN 325 MG ORAL TABLET 325 MG: 325 PILL ORAL at 08:43

## 2017-08-21 RX ADMIN — MORPHINE SULFATE 30 MG: 30 TABLET, EXTENDED RELEASE ORAL at 12:59

## 2017-08-21 RX ADMIN — LACTOBACILLUS ACIDOPHILUS / LACTOBACILLUS BULGARICUS 1 PACKET: 100 MILLION CFU STRENGTH GRANULES at 12:58

## 2017-08-21 RX ADMIN — ATORVASTATIN CALCIUM 40 MG: 40 TABLET, FILM COATED ORAL at 20:38

## 2017-08-21 RX ADMIN — POTASSIUM CHLORIDE 40 MEQ: 1500 TABLET, EXTENDED RELEASE ORAL at 08:44

## 2017-08-21 RX ADMIN — ALBUTEROL SULFATE 2 PUFF: 90 AEROSOL, METERED RESPIRATORY (INHALATION) at 02:12

## 2017-08-21 RX ADMIN — AZITHROMYCIN 500 MG: 250 TABLET, FILM COATED ORAL at 11:23

## 2017-08-21 RX ADMIN — MORPHINE SULFATE 30 MG: 30 TABLET, EXTENDED RELEASE ORAL at 20:37

## 2017-08-21 RX ADMIN — POTASSIUM CHLORIDE 40 MEQ: 1500 TABLET, EXTENDED RELEASE ORAL at 20:38

## 2017-08-21 RX ADMIN — AMOXICILLIN AND CLAVULANATE POTASSIUM 1 TABLET: 875; 125 TABLET, FILM COATED ORAL at 11:23

## 2017-08-21 RX ADMIN — HEPARIN SODIUM 5000 UNITS: 5000 INJECTION, SOLUTION INTRAVENOUS; SUBCUTANEOUS at 15:46

## 2017-08-21 RX ADMIN — NYSTATIN 500000 UNITS: 500000 SUSPENSION ORAL at 12:59

## 2017-08-21 RX ADMIN — LACTOBACILLUS ACIDOPHILUS / LACTOBACILLUS BULGARICUS 1 PACKET: 100 MILLION CFU STRENGTH GRANULES at 19:32

## 2017-08-21 RX ADMIN — FUROSEMIDE 40 MG: 40 TABLET ORAL at 11:23

## 2017-08-21 RX ADMIN — HEPARIN SODIUM 5000 UNITS: 5000 INJECTION, SOLUTION INTRAVENOUS; SUBCUTANEOUS at 05:39

## 2017-08-21 RX ADMIN — FLUCONAZOLE 200 MG: 200 TABLET ORAL at 11:22

## 2017-08-21 RX ADMIN — IPRATROPIUM BROMIDE AND ALBUTEROL SULFATE 3 ML: .5; 3 SOLUTION RESPIRATORY (INHALATION) at 07:17

## 2017-08-21 RX ADMIN — HYDROCODONE BITARTRATE AND ACETAMINOPHEN 1 TABLET: 10; 325 TABLET ORAL at 08:43

## 2017-08-21 ASSESSMENT — PAIN SCALES - GENERAL
PAINLEVEL_OUTOF10: 8
PAINLEVEL_OUTOF10: 6

## 2017-08-21 ASSESSMENT — ACTIVITIES OF DAILY LIVING (ADL): TOILETING: INDEPENDENT

## 2017-08-21 NOTE — CONSULTS
Cardiology Consult Note:    Jeuss Card  Date & Time note created:    8/20/2017   8:23 PM     Referring MD:  Dr. Barker    Patient ID:   Name:             Layo Garcia     YOB: 1970  Age:                 46 y.o.  male   MRN:               [id number]                                                             Reason for Consult:      Abnormal echo    History of Present Illness:    45 y/o M with Psoriasis recently strarted on methotrexate.  He developed severe sepsis and was transferred to [facility].  He briefly requireed pressors and during his hypotension has an echo that showed a low to low normal EF in the setting of sepsis and sinus tachycardia of 110-120.  He has no risk factors.  He has been transferred to the floor.    Review of Systems:      Constitutional: Denies fevers, Denies weight changes  Eyes: Denies changes in vision, no eye pain  Ears/Nose/Throat/Mouth: Denies nasal congestion or sore throat   Cardiovascular: no chest pain, no palpitations   Respiratory: no shortness of breath , Denies cough  Gastrointestinal/Hepatic: Denies abdominal pain, nausea, vomiting, diarrhea, constipation or GI bleeding   Genitourinary: Denies dysuria or frequency  Musculoskeletal/Rheum: Denies  joint pain and swelling, no edema  Skin: Denies rash  Neurological: Denies headache, confusion, memory loss or focal weakness/parasthesias  Psychiatric: denies mood disorder   Endocrine: Farheen thyroid problems  Heme/Oncology/Lymph Nodes: Denies enlarged lymph nodes, denies brusing or known bleeding disorder  All other systems were reviewed and are negative (AMA/CMS criteria)                Past Medical History:   No past medical history on file.  Active Hospital Problems    Diagnosis   • Septic shock due to CAP, immunocompromised state (CMS-[facility]) [A41.9, R65.21]     Priority: High   • Acute renal failure (ARF) due to severe sepsis/septic shock (CMS-HCC) [N17.9]     Priority: Medium   • Elevated troponin level due to  demand ischemia from sepsis [R74.8]     Priority: Medium   • Hypovolemic hyponatremia [E87.1]     Priority: Medium   • Hypokalemia [E87.6]     Priority: Medium   • Acute respiratory failure with hypoxia due to sepsis, CAP (CMS-HCC) [J96.01]     Priority: Medium   • Right lower lobe pneumonia [J18.1]     Priority: Medium   • Immunocompromised status associated with infection (CMS-HCC) [D84.9, B99.9]     Priority: Medium   • Hypertriglyceridemia [E78.1]     Priority: Low   • Psoriasis [L40.9]     Priority: Low   • History of tobacco abuse [Z87.891]     Priority: Low   • Marijuana use, continuous [F12.90]     Priority: Low   • History of alcohol abuse [Z87.898]     Priority: Low   • Gastric ulcer [K25.9]     Priority: Low   • Chronic back pain [M54.9, G89.29]     Priority: Low       Past Surgical History:  No past surgical history on file.    Hospital Medications:  Current Facility-Administered Medications   Medication Dose   • ipratropium-albuterol (DUONEB) nebulizer solution 3 mL  3 mL   • potassium chloride SA (Kdur) tablet 40 mEq  40 mEq   • omeprazole (PRILOSEC) capsule 20 mg  20 mg   • loperamide (IMODIUM) capsule 2 mg  2 mg   • albuterol inhaler 2 Puff  2 Puff   • aspirin (ASA) tablet 325 mg  325 mg   • atorvastatin (LIPITOR) tablet 40 mg  40 mg   • calcium carbonate (TUMS) chewable tab 500 mg  500 mg   • DOPamine 1600 mcg/mL in D5W premix  0-20 mcg/kg/min   • piperacillin-tazobactam (ZOSYN) 4.5 g in  mL IVPB  4.5 g   • heparin injection 5,000 Units  5,000 Units   • vancomycin 1,400 mg in  mL IVPB  15 mg/kg   • norepinephrine (LEVOPHED) 8 mg in  mL Infusion  0-30 mcg/min   • vasopressin (VASOSTRICT) 20 Units in  mL Infusion  0.03 Units/min   • phenylephrine (KELLIE-SYNEPHRINE) 40,000 mcg in  mL Infusion  0-300 mcg/min   • folic acid (FOLVITE) tablet 1 mg  1 mg   • hydrocodone/acetaminophen (NORCO)  MG per tablet 1-2 Tab  1-2 Tab   • NS infusion 2,814 mL  30 mL/kg   •  "senna-docusate (PERICOLACE or SENOKOT S) 8.6-50 MG per tablet 2 Tab  2 Tab    And   • polyethylene glycol/lytes (MIRALAX) PACKET 1 Packet  1 Packet    And   • magnesium hydroxide (MILK OF MAGNESIA) suspension 30 mL  30 mL    And   • bisacodyl (DULCOLAX) suppository 10 mg  10 mg   • Respiratory Care per Protocol     • NS infusion     • ondansetron (ZOFRAN) syringe/vial injection 4 mg  4 mg   • ondansetron (ZOFRAN ODT) dispertab 4 mg  4 mg   • promethazine (PHENERGAN) tablet 12.5-25 mg  12.5-25 mg   • promethazine (PHENERGAN) suppository 12.5-25 mg  12.5-25 mg   • prochlorperazine (COMPAZINE) injection 5-10 mg  5-10 mg   • haloperidol lactate (HALDOL) injection 5 mg  5 mg   • MD ALERT... vancomycin per pharmacy protocol     • fentaNYL (SUBLIMAZE) injection 25-50 mcg  25-50 mcg   • acetaminophen (TYLENOL) tablet 650 mg  650 mg         Current Outpatient Medications:  Prescriptions prior to admission   Medication Sig Dispense Refill Last Dose   • omeprazole (PRILOSEC) 20 MG delayed-release capsule Take 20 mg by mouth every day.      • methotrexate 2.5 MG Tab Take 15 mg by mouth every 7 days.      • folic acid (FOLVITE) 1 MG Tab Take 1 mg by mouth every day.      • morphine ER (MS CONTIN) 15 MG Tab CR tablet Take 30 mg by mouth every 12 hours.      • hydrocodone/acetaminophen (NORCO)  MG Tab Take 1-2 Tabs by mouth every 6 hours as needed for Moderate Pain.      • zolpidem (AMBIEN) 5 MG Tab Take 5 mg by mouth at bedtime as needed for Sleep.      • naproxen (NAPROSYN) 500 MG Tab Take 500 mg by mouth 2 times a day, with meals.          Medication Allergy:  Allergies   Allergen Reactions   • Wellbutrin [Bupropion] Rash     Rash \"all over\"   • Cortisone Unspecified     Causes nausea per pt       Family History:  No family history on file.    Social History:  Social History     Social History   • Marital Status: Single     Spouse Name: N/A   • Number of Children: N/A   • Years of Education: N/A     Occupational History " "  • Not on file.     Social History Main Topics   • Smoking status: Not on file   • Smokeless tobacco: Not on file   • Alcohol Use: Not on file   • Drug Use: Not on file   • Sexual Activity: Not on file     Other Topics Concern   • Not on file     Social History Narrative   • No narrative on file         Physical Exam:  Vitals/ General Appearance:   Weight/BMI: Body mass index is 32.86 kg/(m^2).  Blood pressure 134/98, pulse 93, temperature 37.6 °C (99.6 °F), resp. rate 19, height 1.727 m (5' 8\"), weight 98 kg (216 lb 0.8 oz), SpO2 90 %.  Filed Vitals:    08/20/17 1400 08/20/17 1600 08/20/17 1700 08/20/17 1900   BP:   134/98    Pulse: 92 84 93    Temp:   37.3 °C (99.1 °F) 37.6 °C (99.6 °F)   Resp:   19    Height:       Weight:       SpO2: 92% 94% 90%      Oxygen Therapy:  Pulse Oximetry: 90 %, O2 (LPM): 0, O2 Delivery: None (Room Air)    Constitutional:   Well developed, Well nourished, No acute distress  HENMT:  Normocephalic, Atraumatic, Oropharynx moist mucous membranes, No oral exudates, Nose normal.  No thyromegaly.  Eyes:  EOMI, Conjunctiva normal, No discharge.  Neck:  Normal range of motion, No cervical tenderness,  no JVD.  Cardiovascular:  Normal heart rate, Normal rhythm, No murmurs, No rubs, No gallops.   Extremitites with intact distal pulses, no cyanosis, or edema.  Lungs:  Normal breath sounds, breath sounds clear to auscultation bilaterally,  no crackles, no wheezing.   Abdomen: Bowel sounds normal, Soft, No tenderness, No guarding, No rebound, No masses, No hepatosplenomegaly.  Skin: Warm, Dry, No erythema, No rash, no induration.  Neurologic: Alert & oriented x 3, No focal deficits noted, cranial nerves II through X are grossly intact.  Psychiatric: Affect normal, Judgment normal, Mood normal.      MDM (Data Review):     Records reviewed and summarized in current documentation    Lab Data Review:  Recent Results (from the past 24 hour(s))   CBC without Differential    Collection Time: 08/20/17  " 2:45 AM   Result Value Ref Range    WBC 23.3 (H) 4.8 - 10.8 K/uL    RBC 3.40 (L) 4.70 - 6.10 M/uL    Hemoglobin 10.0 (L) 14.0 - 18.0 g/dL    Hematocrit 30.7 (L) 42.0 - 52.0 %    MCV 90.3 81.4 - 97.8 fL    MCH 29.4 27.0 - 33.0 pg    MCHC 32.6 (L) 33.7 - 35.3 g/dL    RDW 51.2 (H) 35.9 - 50.0 fL    Platelet Count 175 164 - 446 K/uL    MPV 10.0 9.0 - 12.9 fL   Basic Metabolic Panel (BMP)    Collection Time: 08/20/17  2:45 AM   Result Value Ref Range    Sodium 137 135 - 145 mmol/L    Potassium 3.0 (L) 3.6 - 5.5 mmol/L    Chloride 110 96 - 112 mmol/L    Co2 21 20 - 33 mmol/L    Glucose 89 65 - 99 mg/dL    Bun 21 8 - 22 mg/dL    Creatinine 1.21 0.50 - 1.40 mg/dL    Calcium 7.4 (L) 8.4 - 10.2 mg/dL    Anion Gap 6.0 0.0 - 11.9   PROCALCITONIN    Collection Time: 08/20/17  2:45 AM   Result Value Ref Range    Procalcitonin 72.73 (H) <0.25 ng/mL   ESTIMATED GFR    Collection Time: 08/20/17  2:45 AM   Result Value Ref Range    GFR If African American >60 >60 mL/min/1.73 m 2    GFR If Non African American >60 >60 mL/min/1.73 m 2       Imaging/Procedures Review:    Chest Xray:  Reviewed    EKG:   As in HPI.     ECHO:  See report  MDM (Assessment and Plan):     Active Hospital Problems    Diagnosis   • Septic shock due to CAP, immunocompromised state (CMS-HCC) [A41.9, R65.21]     Priority: High   • Acute renal failure (ARF) due to severe sepsis/septic shock (CMS-HCC) [N17.9]     Priority: Medium   • Elevated troponin level due to demand ischemia from sepsis [R74.8]     Priority: Medium   • Hypovolemic hyponatremia [E87.1]     Priority: Medium   • Hypokalemia [E87.6]     Priority: Medium   • Acute respiratory failure with hypoxia due to sepsis, CAP (CMS-HCC) [J96.01]     Priority: Medium   • Right lower lobe pneumonia [J18.1]     Priority: Medium   • Immunocompromised status associated with infection (CMS-[facility]) [D84.9, B99.9]     Priority: Medium   • Hypertriglyceridemia [E78.1]     Priority: Low   • Psoriasis [L40.9]     Priority:  Low   • History of tobacco abuse [Z87.891]     Priority: Low   • Marijuana use, continuous [F12.90]     Priority: Low   • History of alcohol abuse [Z87.898]     Priority: Low   • Gastric ulcer [K25.9]     Priority: Low   • Chronic back pain [M54.9, G89.29]     Priority: Low     47 y/o M with sepsis and abnormal echocardiogram.  I think this is likely from his severe sepsis.  I would simply recommend recheck the echo when is more stable.  He is not a candidate for any cardiac interventions until he is well so I would wait a few days before repeating his echo.  The same explanation is also true of his detectable but indetereminant troponin.  If the echocardiogram comes back abnormal again, please reconsult cardiology.  We will sign off.    Thank for you allowing me to take part in your patient's care, please call should you have any questions or would like to discuss this patient.

## 2017-08-21 NOTE — PROGRESS NOTES
1900: Bedside report received from Betzy CLAYTON, patient updated about POC and denies any needs or complaints at this time, safety precautions in place, CLIP    1930: Assessment performed, patient is A&O x 4 at this time, patient has a flat affect but is very pleasant, wife is at bedside, due medications administered per MAR, patient denies any needs or complaints at this time, all lines remain patent, safety precautions in place, CLIP    2015: Remaining due medications administered per MAR, patient denies any  Needs or complaints, no s/s of acute distress noted    2223: Patient awoke from a nightmare and is in distress, RT called for breathing treatment, patient complaining of 7/10 lower back pain, prn pain medication administered per MAR    2330: Due IV abx administered per MAR, patient provided with silicone cover for oxygen tubing due to redness behind ear, patient denies any needs or complaints at this time, bed alarm is on, CLIP    0300: Patient resting on and off in bed, 2L of O2 on via NC, no s/s of distress noted, respirations even and unlabored, safety precautions remain in place, CLIP    0600: PIV infiltrated, x1 attempt made at IV start without success. 2 more attempts made by Yael CLAYTON using ultrasound, PIV attempt still unsuccessful. Patient would like a break from attempts at this time, will pass onto day shift RN to retry.    0700: Bedside report given to Tayla CLAYTON, patient sitting on the commode at this time and denies any needs or complaints at this time

## 2017-08-21 NOTE — PROGRESS NOTES
Gave report to FORREST Reynolds. Plan of care discussed. Safety precautions in place. Personal belongings and call light with in reach. Patient SOB checked oxygen saturation low 90s on 3L. Albuterol provided.

## 2017-08-21 NOTE — PROGRESS NOTES
0800--Mora CLAYTON from ICU at bedside with US for IV. Multiple attempts from night shift last night with no success at access.    0845--Mora CLAYTON unable to place IV, will return after breakfast. Assessment completed. Pt AAOx4, fatigued/withdrawn. C/o back pain 8/10 and nausea. Medicated per MAR. Due meds given. Pt diet advanced per pt request, pt not tolerating well. Educated pt on alternative diet options, i.e. Liquids, gi soft diet etc. Pt spouse at bedside. Pt angulo draining to gravity. No additional needs, call light in reach, will monitor.

## 2017-08-21 NOTE — PROGRESS NOTES
Bedside report received from Gail CLAYTON. Assumed care. POC discussed. Pt resting comfortably in bed. Safety precautions in place.

## 2017-08-21 NOTE — PROGRESS NOTES
Received bedside report from FORREST Steward. Plan of care discussed. Safety precautions in place. No needs at this time. Personal belongings and call light within reach.

## 2017-08-21 NOTE — CARE PLAN
Problem: Communication  Goal: The ability to communicate needs accurately and effectively will improve  Outcome: PROGRESSING AS EXPECTED  Oriented patient to the call light in order to alert staff of his needs. Educated patient about the plan of care, procedures, treatments, medications, and allowing for patient questions and answering them appropriately    Problem: Safety  Goal: Will remain free from falls  Outcome: PROGRESSING AS EXPECTED  Assessing patient for risk factors for falls and implementing fall precautions as needed. Bed controls are on and bed is locked in a low position, treaded slipper socks on patient, CLIP    Problem: Infection  Goal: Will remain free from infection  Outcome: PROGRESSING AS EXPECTED  Administering antiinfective (Vancomycin and Zosyn) as ordered and assessing for signs and symptoms of improvement    Problem: Respiratory:  Goal: Respiratory status will improve  Outcome: PROGRESSING AS EXPECTED  Assessing and monitoring patient's pulmonary status, administering and titrating oxygen therapy as needed. Educating patient to perform coughing and deep breathing

## 2017-08-21 NOTE — PROGRESS NOTES
[facility] Hospitalist Progress Note    Date of Service: 2017    Chief Complaint  46/M with psoriasis on MTX, GERD, admitted for ALOC, with ongoing N/V, with fevers and chills, along with cough. Noted to be hypotensive at an outside facility, minimally improved with IVF and started on levophed and transferred to [facility]. WBC 34.8. Lactate 6.8. Na 133. K 3.4. CXR showed dense right upper lobe opacity. Felt to have pneumonia. Started on IVF, antibiotics (zosyn, vancomycin), and pressors. Chest CT showed right upper lobe airspace opacity. CXR today showed unchanged RUL consolidation (personally reviewed). PCT 73.39. Blood cultures negative. C diff negative. MRSA ICS negative. Pending sputum GS/culture. Echo showed EF 45%, dyskinetic inferior and septal walls, grade I diastolic dysfunction, right ventricle was normal in size and function, with no significant valve disease or flow abnormalities. Successfully taken off pressors.         Interval Problem Update  2017 - no overnight events. Remains hemodynamically stable and afebrile. Stable on 2L O2 NC. WBC down to 13.3. BMP unimpressive. CXR showed unchanged right upper lobe consolidation (personally reviewed). PCT down to 33.68. Cards recommended repeat echo in several days once fully better. Sputum culture growing yeast. PT/OT recommended no further skilled therapy needs.    > Seen and examined. Still feels weak. (+) cough, with pleuritic CP. (+) sputum production. (+) anasarca. (+) nausea but no vomiting. (+) diarrhea. Lost IV access, with difficult placement.       Consultants/Specialty  Cardiology    Disposition  Monitor on telemetry.         ROS     Pertinent positives/negatives as mentioned above.     A complete review of systems was done. All other systems were negative.      Physical Exam  Laboratory/Imaging   Hemodynamics  Temp (24hrs), Av.1 °C (98.8 °F), Min:36.8 °C (98.3 °F), Max:37.6 °C (99.6 °F)   Temperature: 37.1 °C (98.8 °F), Monitored Temp: 37.2 °C  (99 °F)  Pulse  Av.9  Min: 77  Max: 132 Heart Rate (Monitored): 71  Blood Pressure: 132/90 mmHg, NIBP: 126/83 mmHg CVP (mm Hg): (!) 23 MM HG    Respiratory      Respiration: 20, Pulse Oximetry: 97 %, O2 Daily Delivery Respiratory : Nasal Cannula     Given By:: Mask, #MDI/DPI Given: MDI/DPI x 1, Work Of Breathing / Effort: Mild  RUL Breath Sounds: Clear, RML Breath Sounds: Clear, RLL Breath Sounds: Diminished, FREDO Breath Sounds: Clear, LLL Breath Sounds: Diminished    Fluids    Intake/Output Summary (Last 24 hours) at 17 0729  Last data filed at 17 0600   Gross per 24 hour   Intake   3400 ml   Output   1350 ml   Net   2050 ml       Nutrition  Orders Placed This Encounter   Procedures   • DIET ORDER     Standing Status: Standing      Number of Occurrences: 1      Standing Expiration Date:      Order Specific Question:  Diet:     Answer:  Full Liquid [11]     Physical Exam   Constitutional: He appears well-developed. No distress.   HENT:   Head: Normocephalic and atraumatic.   Mouth/Throat: Oropharynx is clear and moist. No oropharyngeal exudate.   Moist lips/oral mucosa.   Eyes: EOM are normal. Pupils are equal, round, and reactive to light. Right eye exhibits no discharge. Left eye exhibits no discharge. No scleral icterus.   Neck: Normal range of motion. Neck supple. No thyromegaly present.   Cardiovascular: Normal rate and regular rhythm.  Exam reveals no gallop and no friction rub.    No murmur heard.  Pulmonary/Chest: Effort normal. He has no wheezes. He exhibits no tenderness.   Diminished air entry B/L bases   Abdominal: Soft. Bowel sounds are normal. There is no tenderness. There is no rebound and no guarding.   Genitourinary:   (+) edematous scrotum   Musculoskeletal: Normal range of motion. He exhibits edema (2+ B/L LE and UE edema). He exhibits no tenderness.   Lymphadenopathy:     He has no cervical adenopathy.   Neurological: He is alert.   Skin: Skin is warm and dry. Rash (psoriatic  rashes on UE and LE) noted. He is not diaphoretic. No erythema.   Psychiatric: He has a normal mood and affect. His behavior is normal. Thought content normal.   Vitals reviewed.      Recent Labs      08/19/17 0252 08/20/17 0245 08/21/17   0509   WBC  34.4*  23.3*  13.3*   RBC  3.73*  3.40*  3.76*   HEMOGLOBIN  11.4*  10.0*  11.3*   HEMATOCRIT  34.1*  30.7*  33.5*   MCV  91.4  90.3  89.1   MCH  30.6  29.4  30.1   MCHC  33.4*  32.6*  33.7   RDW  50.9*  51.2*  49.9   PLATELETCT  234  175  187   MPV  9.5  10.0  10.5     Recent Labs      08/19/17 0252 08/20/17   0245  08/21/17   0509   SODIUM  139  137  141   POTASSIUM  3.2*  3.0*  4.1   CHLORIDE  114*  110  113*   CO2  18*  21  23   GLUCOSE  163*  89  92   BUN  23*  21  18   CREATININE  1.42*  1.21  0.99   CALCIUM  7.2*  7.4*  8.1*             Recent Labs      08/19/17   1435   TRIGLYCERIDE  168*   HDL  16*   LDL  54          Assessment/Plan     Septic shock due to CAP, immunocompromised state (CMS-HCC) (present on admission)  Assessment & Plan  - This is severe sepsis with the following associated acute organ dysfunction(s): acute kidney failure, acute respiratory failure.  - doing well off pressors, and in fact BP on higher side.   - D/C IVF, and start PO lasix.   - deescalate antibiotics as below. Continue to trend PCT and follow cultures.   - continue to monitor on telemetry.     Right lower lobe pneumonia (present on admission)  Assessment & Plan  - in setting of immunocompromised state. Yeast in sputum likely colonizer, but as has thrush too, will treat.   - as lost IV access, change antibiotics to augmentin and azithromycin. Start diflucan. Continue to trend procalcitonin. Follow sputum GS/culture and blood cultures.   - will need repeat chest imaging in 4-6 weeks to document resolution of infiltrates.    Immunocompromised status associated with infection (CMS-[facility]) (present on admission)  Assessment & Plan  - continue to hold methotrexate for now.      Acute renal failure (ARF) due to severe sepsis/septic shock (CMS-HCC) (present on admission)  Assessment & Plan  - resolved.   - D/C IVF, and start lasix 40mg PO BID to remove fluids as edematous. Repeat BMP in AM.    Elevated troponin level due to demand ischemia from sepsis (present on admission)  Assessment & Plan  - likely demand ischemia from sepsis. Wall abnormalities on echo likely from sepsis.   - continue ASA and lipitor. Repeat echo in several days once fully better to reevaluate per cardiology. May need to reconsult cards if still with regional dyskinesia despite resolution of sepsis.   - continue cardiac monitoring. Monitor for CP.     Hypovolemic hyponatremia (present on admission)  Assessment & Plan  - resolved. Due to sepsis.   - D/C IVF. Start lasix. Continue to monitor BMP.    Hypokalemia (present on admission)  Assessment & Plan  - continue kdur 40mEq PO BID. Mg WNL. BMP in AM.    Acute respiratory failure with hypoxia due to sepsis, CAP (CMS-HCC) (present on admission)  Assessment & Plan  - continue BD per RT protocol. Continue O2 support.   - continue antibiotics for CAP. Start PO lasix.     Oral thrush  Assessment & Plan  - start diflucan, along with nystatin swish and swallow.     Psoriasis (present on admission)  Assessment & Plan  - continue to hold methotrexate for now. Continue topicals.    History of tobacco abuse (present on admission)  Assessment & Plan  - Currently in remission.    Marijuana use, continuous (present on admission)  Assessment & Plan  - uses medical marijuana.    History of alcohol abuse (present on admission)  Assessment & Plan  - remains sober x 10 years now.    Gastric ulcer (present on admission)  Assessment & Plan  - continue PPI. Avoid NSAIDS.    Chronic back pain (present on admission)  Assessment & Plan  - resume home dose MS contin, along with PRN norco.       Hypertriglyceridemia (present on admission)  Assessment & Plan  - continue lipitor.       Labs  reviewed, Medications reviewed and Radiology images reviewed  Vidales catheter: Critically Ill - Requiring Accurate Measurement of Urinary Output      DVT Prophylaxis: Heparin    Ulcer prophylaxis: Yes  Antibiotics: Treating active infection/contamination beyond 24 hours perioperative coverage  Assessed for rehab: Patient unable to tolerate rehabilitation therapeutic regimen

## 2017-08-21 NOTE — FLOWSHEET NOTE
08/20/17 2241   Events/Summary/Plan   Events/Summary/Plan Pt requested breathing treatment   Interdisciplinary Plan of Care-Goals (Indications)   Obstructive Ventilatory Defect or Pulmonary Disease without Obvious Obstruction Strong Subjective / Objective Improvement   Interdisciplinary Plan of Care-Outcomes    Bronchodilator Outcome Patient at Stable Baseline   Education   Education Yes - Pt. / Family has been Instructed in use of Respiratory Equipment   RT Assessment of Delivered Medications   Evaluation of Medication Delivery Daily Yes-- Pt /Family has been Instructed in use of Respiratory Medications and Adverse Reactions   SVN Group   #SVN Performed Yes   Given By: Mask   Respiratory WDL   Respiratory (WDL) X   Chest Exam   Respiration 18   Heart Rate (Monitored) 81   Breath Sounds   Pre/Post Intervention Pre Intervention Assessment   RUL Breath Sounds Clear   RML Breath Sounds Clear   RLL Breath Sounds Diminished   FREDO Breath Sounds Clear   LLL Breath Sounds Diminished   Oximetry   #Pulse Oximetry (Single Determination) Yes   Oxygen   Home O2 Use Prior To Admission? No   Pulse Oximetry 88 %  (placed back on NC 2L)   O2 (LPM) 0   O2 (FiO2) 21   O2 Daily Delivery Respiratory  Room Air with O2 Available   Felt better with breathing treatment, sats stayed at 93% on RA.

## 2017-08-21 NOTE — PROGRESS NOTES
"Pharmacy Kinetics 46 y.o. male on vancomycin day # 5 2017    Currently on Vancomycin 1400 mg iv q12hr    Indication for Treatment: Septic shock due to RLL pneumonia, immunocompromised state    Pertinent history per medical record: Admitted on 2017 for Septic shock with respiratory failure and ALOC, ongoing N/V, fever, chills, and cough requiring vasopressors, acute renal failure, elevated troponin, hypovolemia hyponatremia, hypokalemia, .  PMH: Psoriasis on MTX, GERD, tobacco use, marijuana use, EtOH abuse, chronic back pain.    Other antibiotics: Zosyn 4.5 GM IV Q6H    Allergies: Wellbutrin and Cortisone     List concerns for renal function (possible concerns include abnormal LFTs, BUN/SCr ratio > 20:1, CHF, obesity, malnutrition/low albumin, hypermetabolic state (SIRS), pressors/hypotension, nephrotoxic drugs, etc.): obesity, malnutrition/low albumin, hypermetabolic state (SIRS), pressors    Pertinent cultures to date:    BC periph x 2 Neg   C Diff Neg, NAP1-B1 Neg   Resp-Nares-Neg for MRSA   Spurum-POS-Mod WBC's, light growth yeast.  Recent Labs      17   0252  17   0245  17   0509   WBC  34.4*  23.3*  13.3*     Recent Labs      17   0252  17   0245  17   0509   BUN  23*  21  18   CREATININE  1.42*  1.21  0.99     Recent Labs      17   1625  17   0509   VANCOTROUGH   --   23.3*   VANCORANDOM  20.9   --      Intake/Output Summary (Last 24 hours) at 17 0756  Last data filed at 17 0600   Gross per 24 hour   Intake   3400 ml   Output   1350 ml   Net   2050 ml      Blood pressure 146/101, pulse 86, temperature 36.9 °C (98.5 °F), resp. rate 20, height 1.727 m (5' 8\"), weight 98 kg (216 lb 0.8 oz), SpO2 97 %. Temp (24hrs), Av.1 °C (98.8 °F), Min:36.8 °C (98.3 °F), Max:37.6 °C (99.6 °F)      A/P   1. Vancomycin dose change: 1100 mg Q12H-new trough to be 17.9  2. Next vancomycin level: In 2-3 days  3. Goal trough: 16-20  4. Comments: " Will monitor daily and adjust dose as needed per protocol.    Anay Canela

## 2017-08-21 NOTE — PROGRESS NOTES
Tele strip at 1907 shows Sinus rhythm with a HR of 97.      Measurements: .14/.08/.32    Tele Shift Summary:    Rhythm : Sinus rhythm  Rate : 70s to 90s    Ectopy : Per CCT [place] pt had rare PVCs/couplet    Telemetry monitoring strips placed in pt chart.

## 2017-08-21 NOTE — PROGRESS NOTES
MD rounded this AM. Spouse at bedside. Informed MD of pt's lack of IV access. Will continue to try for access but pt will be transitioned to PO medications. Lasix added for pt's edema. Vidales remains in place. Pt has thrush in mouth, new orders received. Pt had shower today. Pt sleeps on and off throughout the day.  No other changes in pt status, will CTM.    Pt refusing IV access today. Will try again tomorrow. Pt understands risks and benefits of having IV access.

## 2017-08-21 NOTE — THERAPY
"Occupational Therapy Evaluation completed.   Functional Status:  A&Ox3. Vidales in place. Sup><sit, STS with SBA. ADL transfers with SBA. Stands at sink to groom with SBA/CGA. Toileting with SBA. ModA with donning socks. Fatigues after 100' walk with FWW, SBA.    Plan of Care: Will benefit from Occupational Therapy 3 times per week  Discharge Recommendations:  Equipment: Will Continue to Assess for Equipment Needs. Post-acute therapy Discharge to home with home health for additional skilled therapy services.  Lives with wife and 3 y/o child.  See \"Rehab Therapy-Acute\" Patient Summary Report for complete documentation.    "

## 2017-08-21 NOTE — PROGRESS NOTES
Tele strip at 0824 shows SR w/ HR of 87  Measurements: .12/.10/.38    Tele Shift Summary:  Rhythm: SR  Rate: 80's-90's  Ectopy: Per CCT [place], pt had rare PVC's.    Telemetry monitoring strips placed in pt chart.

## 2017-08-22 ENCOUNTER — APPOINTMENT (OUTPATIENT)
Dept: RADIOLOGY | Facility: MEDICAL CENTER | Age: 47
DRG: 871 | End: 2017-08-22
Attending: HOSPITALIST
Payer: COMMERCIAL

## 2017-08-22 LAB
ANION GAP SERPL CALC-SCNC: 8 MMOL/L (ref 0–11.9)
BUN SERPL-MCNC: 11 MG/DL (ref 8–22)
CALCIUM SERPL-MCNC: 8.3 MG/DL (ref 8.4–10.2)
CHLORIDE SERPL-SCNC: 104 MMOL/L (ref 96–112)
CO2 SERPL-SCNC: 27 MMOL/L (ref 20–33)
CREAT SERPL-MCNC: 0.97 MG/DL (ref 0.5–1.4)
ERYTHROCYTE [DISTWIDTH] IN BLOOD BY AUTOMATED COUNT: 47.8 FL (ref 35.9–50)
GFR SERPL CREATININE-BSD FRML MDRD: >60 ML/MIN/1.73 M 2
GLUCOSE SERPL-MCNC: 94 MG/DL (ref 65–99)
HCT VFR BLD AUTO: 34.4 % (ref 42–52)
HGB BLD-MCNC: 11.6 G/DL (ref 14–18)
MCH RBC QN AUTO: 29.4 PG (ref 27–33)
MCHC RBC AUTO-ENTMCNC: 33.7 G/DL (ref 33.7–35.3)
MCV RBC AUTO: 87.3 FL (ref 81.4–97.8)
PLATELET # BLD AUTO: 217 K/UL (ref 164–446)
PMV BLD AUTO: 10.2 FL (ref 9–12.9)
POTASSIUM SERPL-SCNC: 3.1 MMOL/L (ref 3.6–5.5)
PROCALCITONIN SERPL-MCNC: 24.87 NG/ML
RBC # BLD AUTO: 3.94 M/UL (ref 4.7–6.1)
SODIUM SERPL-SCNC: 139 MMOL/L (ref 135–145)
WBC # BLD AUTO: 12.6 K/UL (ref 4.8–10.8)

## 2017-08-22 PROCEDURE — 84145 PROCALCITONIN (PCT): CPT

## 2017-08-22 PROCEDURE — 99233 SBSQ HOSP IP/OBS HIGH 50: CPT | Performed by: HOSPITALIST

## 2017-08-22 PROCEDURE — 97116 GAIT TRAINING THERAPY: CPT

## 2017-08-22 PROCEDURE — 71010 DX-CHEST-PORTABLE (1 VIEW): CPT

## 2017-08-22 PROCEDURE — A9270 NON-COVERED ITEM OR SERVICE: HCPCS | Performed by: HOSPITALIST

## 2017-08-22 PROCEDURE — 770020 HCHG ROOM/CARE - TELE (206)

## 2017-08-22 PROCEDURE — 99406 BEHAV CHNG SMOKING 3-10 MIN: CPT

## 2017-08-22 PROCEDURE — 700102 HCHG RX REV CODE 250 W/ 637 OVERRIDE(OP): Performed by: INTERNAL MEDICINE

## 2017-08-22 PROCEDURE — 94760 N-INVAS EAR/PLS OXIMETRY 1: CPT

## 2017-08-22 PROCEDURE — 85027 COMPLETE CBC AUTOMATED: CPT

## 2017-08-22 PROCEDURE — 700102 HCHG RX REV CODE 250 W/ 637 OVERRIDE(OP): Performed by: HOSPITALIST

## 2017-08-22 PROCEDURE — A9270 NON-COVERED ITEM OR SERVICE: HCPCS | Performed by: INTERNAL MEDICINE

## 2017-08-22 PROCEDURE — 80048 BASIC METABOLIC PNL TOTAL CA: CPT

## 2017-08-22 PROCEDURE — 700111 HCHG RX REV CODE 636 W/ 250 OVERRIDE (IP): Performed by: INTERNAL MEDICINE

## 2017-08-22 RX ORDER — POTASSIUM CHLORIDE 20 MEQ/1
40 TABLET, EXTENDED RELEASE ORAL 3 TIMES DAILY
Status: DISCONTINUED | OUTPATIENT
Start: 2017-08-22 | End: 2017-08-24 | Stop reason: HOSPADM

## 2017-08-22 RX ORDER — LORAZEPAM 0.5 MG/1
0.5 TABLET ORAL EVERY 4 HOURS PRN
Status: DISCONTINUED | OUTPATIENT
Start: 2017-08-22 | End: 2017-08-24 | Stop reason: HOSPADM

## 2017-08-22 RX ORDER — BACITRACIN ZINC AND POLYMYXIN B SULFATE 500; 1000 [USP'U]/G; [USP'U]/G
OINTMENT TOPICAL 2 TIMES DAILY
Status: DISCONTINUED | OUTPATIENT
Start: 2017-08-22 | End: 2017-08-24 | Stop reason: HOSPADM

## 2017-08-22 RX ADMIN — POTASSIUM CHLORIDE 40 MEQ: 1500 TABLET, EXTENDED RELEASE ORAL at 10:14

## 2017-08-22 RX ADMIN — BACITRACIN ZINC AND POLYMYXIN B SULFATE: 500; 10000 OINTMENT TOPICAL at 20:59

## 2017-08-22 RX ADMIN — PROMETHAZINE HYDROCHLORIDE 25 MG: 25 TABLET ORAL at 02:33

## 2017-08-22 RX ADMIN — BACITRACIN ZINC AND POLYMYXIN B SULFATE: 500; 10000 OINTMENT TOPICAL at 13:47

## 2017-08-22 RX ADMIN — ATORVASTATIN CALCIUM 40 MG: 40 TABLET, FILM COATED ORAL at 20:56

## 2017-08-22 RX ADMIN — FLUCONAZOLE 200 MG: 200 TABLET ORAL at 10:13

## 2017-08-22 RX ADMIN — MORPHINE SULFATE 30 MG: 30 TABLET, EXTENDED RELEASE ORAL at 20:54

## 2017-08-22 RX ADMIN — LACTOBACILLUS ACIDOPHILUS / LACTOBACILLUS BULGARICUS 1 PACKET: 100 MILLION CFU STRENGTH GRANULES at 18:13

## 2017-08-22 RX ADMIN — AMOXICILLIN AND CLAVULANATE POTASSIUM 1 TABLET: 875; 125 TABLET, FILM COATED ORAL at 20:57

## 2017-08-22 RX ADMIN — NYSTATIN 500000 UNITS: 500000 SUSPENSION ORAL at 13:05

## 2017-08-22 RX ADMIN — HEPARIN SODIUM 5000 UNITS: 5000 INJECTION, SOLUTION INTRAVENOUS; SUBCUTANEOUS at 13:05

## 2017-08-22 RX ADMIN — NYSTATIN 500000 UNITS: 500000 SUSPENSION ORAL at 10:14

## 2017-08-22 RX ADMIN — HYDROCODONE BITARTRATE AND ACETAMINOPHEN 1 TABLET: 10; 325 TABLET ORAL at 20:55

## 2017-08-22 RX ADMIN — CALCIUM CARBONATE (ANTACID) CHEW TAB 500 MG 500 MG: 500 CHEW TAB at 02:33

## 2017-08-22 RX ADMIN — AZITHROMYCIN 500 MG: 250 TABLET, FILM COATED ORAL at 10:13

## 2017-08-22 RX ADMIN — HEPARIN SODIUM 5000 UNITS: 5000 INJECTION, SOLUTION INTRAVENOUS; SUBCUTANEOUS at 06:04

## 2017-08-22 RX ADMIN — FOLIC ACID 1 MG: 1 TABLET ORAL at 10:13

## 2017-08-22 RX ADMIN — OMEPRAZOLE 20 MG: 20 CAPSULE, DELAYED RELEASE ORAL at 10:13

## 2017-08-22 RX ADMIN — AMOXICILLIN AND CLAVULANATE POTASSIUM 1 TABLET: 875; 125 TABLET, FILM COATED ORAL at 10:14

## 2017-08-22 RX ADMIN — ASPIRIN 325 MG ORAL TABLET 325 MG: 325 PILL ORAL at 10:13

## 2017-08-22 RX ADMIN — MORPHINE SULFATE 30 MG: 30 TABLET, EXTENDED RELEASE ORAL at 10:13

## 2017-08-22 RX ADMIN — FUROSEMIDE 40 MG: 40 TABLET ORAL at 06:05

## 2017-08-22 RX ADMIN — POTASSIUM CHLORIDE 40 MEQ: 1500 TABLET, EXTENDED RELEASE ORAL at 15:59

## 2017-08-22 RX ADMIN — LACTOBACILLUS ACIDOPHILUS / LACTOBACILLUS BULGARICUS 1 PACKET: 100 MILLION CFU STRENGTH GRANULES at 13:04

## 2017-08-22 RX ADMIN — FUROSEMIDE 40 MG: 40 TABLET ORAL at 15:59

## 2017-08-22 RX ADMIN — POTASSIUM CHLORIDE 40 MEQ: 1500 TABLET, EXTENDED RELEASE ORAL at 20:57

## 2017-08-22 ASSESSMENT — ENCOUNTER SYMPTOMS
DIZZINESS: 0
NERVOUS/ANXIOUS: 0
NAUSEA: 0
SHORTNESS OF BREATH: 0
COUGH: 1
BRUISES/BLEEDS EASILY: 0
MUSCULOSKELETAL NEGATIVE: 1
FEVER: 0
ABDOMINAL PAIN: 0
BACK PAIN: 0
FLANK PAIN: 0
DEPRESSION: 0
LOSS OF CONSCIOUSNESS: 0
VOMITING: 0
GASTROINTESTINAL NEGATIVE: 1
MYALGIAS: 0
PSYCHIATRIC NEGATIVE: 1
WEIGHT LOSS: 0
WEAKNESS: 1
CHILLS: 0

## 2017-08-22 ASSESSMENT — GAIT ASSESSMENTS
DEVIATION: STEP TO
GAIT LEVEL OF ASSIST: SUPERVISED
DISTANCE (FEET): 300

## 2017-08-22 ASSESSMENT — PAIN SCALES - GENERAL
PAINLEVEL_OUTOF10: 8
PAINLEVEL_OUTOF10: 5
PAINLEVEL_OUTOF10: 4

## 2017-08-22 NOTE — PROGRESS NOTES
Bedside report received from Tayla CLAYTON. Plan of care discussed. Pt resting comfortably in bed with safety precautions in place.

## 2017-08-22 NOTE — FLOWSHEET NOTE
08/22/17 0728   Events/Summary/Plan   Events/Summary/Plan PRN tx checked. No distress/SOB noted   Therapy Not Performed   Type of Therapy Not Performed SVN   Reason Therapy Not Performed PRN, No Indication   Respiratory WDL   Respiratory (WDL) X   Chest Exam   Work Of Breathing / Effort Mild;Shallow   Respiration 18   Heart Rate (Monitored) 78   Breath Sounds   RUL Breath Sounds Clear   RML Breath Sounds Clear   RLL Breath Sounds Diminished   FREDO Breath Sounds Clear   LLL Breath Sounds Diminished   Oximetry   #Pulse Oximetry (Single Determination) Yes   Oxygen   Pulse Oximetry 93 %   O2 (LPM) 0   O2 (FiO2) 21   O2 Daily Delivery Respiratory  Room Air with O2 Available

## 2017-08-22 NOTE — CARE PLAN
Problem: Infection  Goal: Will remain free from infection  Outcome: PROGRESSING AS EXPECTED  Pt WBC and procalcitonin being trended. Transitioned pt to oral ABX. Pt afebrile, tmax 99.6 today. Vitals remain stable. Standard precautions and hand hygiene utilized. Pt verbalizes understanding of infection prevention.    Problem: Fluid Volume:  Goal: Will maintain balanced intake and output  Outcome: PROGRESSING AS EXPECTED  Pt started on PO Lasix. Vidales in place. I/O's monitored. Pt edematous, improved after initial dose of lasix. BP remains stable. Pt educated on use of diuretics and fluid volume balance. Pt verbalizes understanding.

## 2017-08-22 NOTE — PROGRESS NOTES
"Assessment completed. Due meds given. Pt had BM this morning. C/o pain in back 8/10. Medicated per MAR. Provided with hot packs. Repositioned into cardiac chair. Pt c/o fatigue, feeling \"drained\" today, barely slept last night. Provided pt with fresh water and drinks/snacks. No additional needs at this time, call light in reach, will monitor closely.  "

## 2017-08-22 NOTE — THERAPY
"Physical Therapy Treatment completed.   Bed Mobility:  Supine to Sit: Modified Independent  Transfers: Sit to Stand: Supervised  Gait: Level Of Assist: Supervised with No Equipment Needed     X 300 feet  Plan of Care: Patient with no further skilled PT needs in the acute care setting at this time  Discharge Recommendations: Equipment: No Equipment Needed. Post-acute therapy Currently anticipate no further skilled therapy needs once patient is discharged from the inpatient setting.     See \"Rehab Therapy-Acute\" Patient Summary Report for complete documentation.       "

## 2017-08-22 NOTE — CARE PLAN
Problem: Infection  Goal: Will remain free from infection  Outcome: PROGRESSING AS EXPECTED  Pt afebrile. WBC and PCT trending down with PO ABX. Pt feeling better symptomatically. Hand hygiene and standard precautions used. Pt verbalizes understanding.    Problem: Mobility  Goal: Risk for activity intolerance will decrease  Outcome: PROGRESSING AS EXPECTED  Pt up with SBA, steady gait, no assistive devices needed. Pt feeling stronger today. Ambulated with PT. Provided with rest periods as needed. Pt encouraged to increase activity as tolerated. Pt verbalizes understanding.

## 2017-08-22 NOTE — PROGRESS NOTES
Bedside report given to Cortez RN. POC discussed. Pt resting comfortably in bed. Safety precautions in place.

## 2017-08-22 NOTE — PROGRESS NOTES
Late entry:    0800--Attempted med pass and assessment this AM. Pt refusing until after breakfast. Refusing IV access attempts until later as well.    0900--Pt wanting full liquid breakfast diet. Awaiting new tray.    0945--Went down to kitchen, took pt's order. Pt at first did not want cream of wheat but explained to pt that was the full liquid breakfast. Brought pt full liquid tray. Diet changed per pt request to full liquid. Pt still refusing meds and assessment until after eating. Will return.

## 2017-08-22 NOTE — RESPIRATORY CARE
"COPD EDUCATION by COPD CLINICAL EDUCATOR  8/22/2017 at 10:30 AM by Merline Cardoso     Patient interviewed by COPD education team.     Smoking Cessation Intervention 3 -10 minutes completed.     Provided smoking cessation packet with \"Tips to Quit\" and flyer for \"Free Smoking Cessation Classes\". Provided \"Quit Card\" with the phone number to [employer] [facility] for free nicotine replacement therapy.   Provided coupon for [employer].  "

## 2017-08-22 NOTE — PROGRESS NOTES
Bedside report received from Cortez CLAYTON. Assumed care. POC discussed. Pt resting comfortably in bed. Safety precautions in place.

## 2017-08-22 NOTE — PROGRESS NOTES
Assessment completed. See flowsheet. Meds given per MAR. Pt resting comfortably in bed with safety precautions in place. Family at bedside.

## 2017-08-22 NOTE — PROGRESS NOTES
Pt resting comfortably in bed with eyes closed. Breaths even and unlabored. Safety precautions in place.

## 2017-08-22 NOTE — CARE PLAN
Problem: Discharge Barriers/Planning  Goal: Patient’s continuum of care needs will be met  Outcome: PROGRESSING AS EXPECTED  Pt's discharge plan discussed. Pt's questions answered.    Problem: Respiratory:  Goal: Respiratory status will improve  Outcome: PROGRESSING AS EXPECTED  Pt's O2 saturation monitored throughout shift. Pt encouraged to deep breathe and cough.

## 2017-08-23 LAB
ANION GAP SERPL CALC-SCNC: 8 MMOL/L (ref 0–11.9)
BACTERIA BLD CULT: NORMAL
BACTERIA BLD CULT: NORMAL
BUN SERPL-MCNC: 11 MG/DL (ref 8–22)
CALCIUM SERPL-MCNC: 8.6 MG/DL (ref 8.4–10.2)
CHLORIDE SERPL-SCNC: 101 MMOL/L (ref 96–112)
CO2 SERPL-SCNC: 30 MMOL/L (ref 20–33)
CREAT SERPL-MCNC: 0.88 MG/DL (ref 0.5–1.4)
ERYTHROCYTE [DISTWIDTH] IN BLOOD BY AUTOMATED COUNT: 45.1 FL (ref 35.9–50)
GFR SERPL CREATININE-BSD FRML MDRD: >60 ML/MIN/1.73 M 2
GLUCOSE SERPL-MCNC: 106 MG/DL (ref 65–99)
HCT VFR BLD AUTO: 36.9 % (ref 42–52)
HGB BLD-MCNC: 12.9 G/DL (ref 14–18)
LV EJECT FRACT  99904: 55
LV EJECT FRACT MOD 2C 99903: 55.23
LV EJECT FRACT MOD 4C 99902: 60.76
LV EJECT FRACT MOD BP 99901: 58.3
MCH RBC QN AUTO: 30.6 PG (ref 27–33)
MCHC RBC AUTO-ENTMCNC: 35 G/DL (ref 33.7–35.3)
MCV RBC AUTO: 87.4 FL (ref 81.4–97.8)
PLATELET # BLD AUTO: 289 K/UL (ref 164–446)
PMV BLD AUTO: 10.2 FL (ref 9–12.9)
POTASSIUM SERPL-SCNC: 2.9 MMOL/L (ref 3.6–5.5)
RBC # BLD AUTO: 4.22 M/UL (ref 4.7–6.1)
SIGNIFICANT IND 70042: NORMAL
SIGNIFICANT IND 70042: NORMAL
SITE SITE: NORMAL
SITE SITE: NORMAL
SODIUM SERPL-SCNC: 139 MMOL/L (ref 135–145)
SOURCE SOURCE: NORMAL
SOURCE SOURCE: NORMAL
WBC # BLD AUTO: 12.7 K/UL (ref 4.8–10.8)

## 2017-08-23 PROCEDURE — 700102 HCHG RX REV CODE 250 W/ 637 OVERRIDE(OP): Performed by: HOSPITALIST

## 2017-08-23 PROCEDURE — A9270 NON-COVERED ITEM OR SERVICE: HCPCS | Performed by: INTERNAL MEDICINE

## 2017-08-23 PROCEDURE — 770020 HCHG ROOM/CARE - TELE (206)

## 2017-08-23 PROCEDURE — A9270 NON-COVERED ITEM OR SERVICE: HCPCS | Performed by: HOSPITALIST

## 2017-08-23 PROCEDURE — 700111 HCHG RX REV CODE 636 W/ 250 OVERRIDE (IP): Performed by: INTERNAL MEDICINE

## 2017-08-23 PROCEDURE — 700102 HCHG RX REV CODE 250 W/ 637 OVERRIDE(OP): Performed by: INTERNAL MEDICINE

## 2017-08-23 PROCEDURE — 93306 TTE W/DOPPLER COMPLETE: CPT | Mod: 26 | Performed by: INTERNAL MEDICINE

## 2017-08-23 PROCEDURE — 99233 SBSQ HOSP IP/OBS HIGH 50: CPT | Performed by: HOSPITALIST

## 2017-08-23 PROCEDURE — 80048 BASIC METABOLIC PNL TOTAL CA: CPT

## 2017-08-23 PROCEDURE — 93306 TTE W/DOPPLER COMPLETE: CPT

## 2017-08-23 PROCEDURE — 85027 COMPLETE CBC AUTOMATED: CPT

## 2017-08-23 RX ADMIN — NYSTATIN 500000 UNITS: 500000 SUSPENSION ORAL at 13:28

## 2017-08-23 RX ADMIN — POTASSIUM CHLORIDE 40 MEQ: 1500 TABLET, EXTENDED RELEASE ORAL at 20:15

## 2017-08-23 RX ADMIN — AMOXICILLIN AND CLAVULANATE POTASSIUM 1 TABLET: 875; 125 TABLET, FILM COATED ORAL at 20:15

## 2017-08-23 RX ADMIN — MORPHINE SULFATE 30 MG: 30 TABLET, EXTENDED RELEASE ORAL at 20:14

## 2017-08-23 RX ADMIN — LACTOBACILLUS ACIDOPHILUS / LACTOBACILLUS BULGARICUS 1 PACKET: 100 MILLION CFU STRENGTH GRANULES at 17:42

## 2017-08-23 RX ADMIN — LACTOBACILLUS ACIDOPHILUS / LACTOBACILLUS BULGARICUS 1 PACKET: 100 MILLION CFU STRENGTH GRANULES at 08:47

## 2017-08-23 RX ADMIN — BACITRACIN ZINC AND POLYMYXIN B SULFATE: 500; 10000 OINTMENT TOPICAL at 09:06

## 2017-08-23 RX ADMIN — NYSTATIN 500000 UNITS: 500000 SUSPENSION ORAL at 17:42

## 2017-08-23 RX ADMIN — AZITHROMYCIN 500 MG: 250 TABLET, FILM COATED ORAL at 08:48

## 2017-08-23 RX ADMIN — FOLIC ACID 1 MG: 1 TABLET ORAL at 08:48

## 2017-08-23 RX ADMIN — LACTOBACILLUS ACIDOPHILUS / LACTOBACILLUS BULGARICUS 1 PACKET: 100 MILLION CFU STRENGTH GRANULES at 10:59

## 2017-08-23 RX ADMIN — OMEPRAZOLE 20 MG: 20 CAPSULE, DELAYED RELEASE ORAL at 08:50

## 2017-08-23 RX ADMIN — ATORVASTATIN CALCIUM 40 MG: 40 TABLET, FILM COATED ORAL at 20:15

## 2017-08-23 RX ADMIN — ACETAMINOPHEN 650 MG: 325 TABLET, FILM COATED ORAL at 03:28

## 2017-08-23 RX ADMIN — CARBAMIDE PEROXIDE 6.5% OTIC SOLN 6 DROP: 6.5 SOLUTION at 13:28

## 2017-08-23 RX ADMIN — HEPARIN SODIUM 5000 UNITS: 5000 INJECTION, SOLUTION INTRAVENOUS; SUBCUTANEOUS at 06:23

## 2017-08-23 RX ADMIN — HEPARIN SODIUM 5000 UNITS: 5000 INJECTION, SOLUTION INTRAVENOUS; SUBCUTANEOUS at 13:28

## 2017-08-23 RX ADMIN — NYSTATIN 500000 UNITS: 500000 SUSPENSION ORAL at 08:50

## 2017-08-23 RX ADMIN — BACITRACIN ZINC AND POLYMYXIN B SULFATE: 500; 10000 OINTMENT TOPICAL at 20:15

## 2017-08-23 RX ADMIN — POTASSIUM CHLORIDE 40 MEQ: 1500 TABLET, EXTENDED RELEASE ORAL at 08:49

## 2017-08-23 RX ADMIN — FUROSEMIDE 40 MG: 40 TABLET ORAL at 06:23

## 2017-08-23 RX ADMIN — HEPARIN SODIUM 5000 UNITS: 5000 INJECTION, SOLUTION INTRAVENOUS; SUBCUTANEOUS at 20:14

## 2017-08-23 RX ADMIN — ASPIRIN 325 MG ORAL TABLET 325 MG: 325 PILL ORAL at 08:47

## 2017-08-23 RX ADMIN — FLUCONAZOLE 200 MG: 200 TABLET ORAL at 08:48

## 2017-08-23 RX ADMIN — AMOXICILLIN AND CLAVULANATE POTASSIUM 1 TABLET: 875; 125 TABLET, FILM COATED ORAL at 08:47

## 2017-08-23 RX ADMIN — POTASSIUM CHLORIDE 40 MEQ: 1500 TABLET, EXTENDED RELEASE ORAL at 15:49

## 2017-08-23 RX ADMIN — MORPHINE SULFATE 30 MG: 30 TABLET, EXTENDED RELEASE ORAL at 11:44

## 2017-08-23 RX ADMIN — FUROSEMIDE 40 MG: 40 TABLET ORAL at 15:49

## 2017-08-23 ASSESSMENT — ENCOUNTER SYMPTOMS
MYALGIAS: 0
PSYCHIATRIC NEGATIVE: 1
MUSCULOSKELETAL NEGATIVE: 1
NERVOUS/ANXIOUS: 0
VOMITING: 0
CHILLS: 0
WEAKNESS: 1
GASTROINTESTINAL NEGATIVE: 1
LOSS OF CONSCIOUSNESS: 0
DIZZINESS: 0
NAUSEA: 0
DEPRESSION: 0
COUGH: 1
SHORTNESS OF BREATH: 0
BRUISES/BLEEDS EASILY: 0
FLANK PAIN: 0
WEIGHT LOSS: 0
FEVER: 0
ABDOMINAL PAIN: 0
BACK PAIN: 0

## 2017-08-23 ASSESSMENT — PAIN SCALES - GENERAL
PAINLEVEL_OUTOF10: 0
PAINLEVEL_OUTOF10: 8
PAINLEVEL_OUTOF10: 6

## 2017-08-23 NOTE — CARE PLAN
Problem: Safety  Goal: Will remain free from falls  Outcome: PROGRESSING AS EXPECTED  Pt encouraged to call for assistance as needed. Safety precautions in place. Regular rounding.    Problem: Discharge Barriers/Planning  Goal: Patient’s continuum of care needs will be met  Outcome: PROGRESSING AS EXPECTED  Discharge plan discussed with pt. Pt's questions answered.

## 2017-08-23 NOTE — PROGRESS NOTES
[facility] Hospitalist Progress Note    Date of Service: 2017    Chief Complaint  46/M with psoriasis on MTX, GERD, severe sepsis due to pneumonia, PURA.        Interval Problem Update  Stable on room air, pro-calcitonin levels and white blood cell count coming down. White blood cell count dropped dramatically from 23-13, the patient is also doing very well clinically. He has severe edema including scrotal edema which is starting to improve with oral Lasix. Wife is at the bedside    Consultants/Specialty  Cardiology    Disposition  Will go home to [place] with his wife.        Review of Systems   Constitutional: Negative for fever, chills, weight loss and malaise/fatigue.   HENT: Negative.    Respiratory: Positive for cough. Negative for shortness of breath.    Cardiovascular: Positive for leg swelling. Negative for chest pain.   Gastrointestinal: Negative.  Negative for nausea, vomiting and abdominal pain.   Genitourinary: Negative.  Negative for dysuria and flank pain.   Musculoskeletal: Negative.  Negative for myalgias and back pain.   Neurological: Positive for weakness. Negative for dizziness and loss of consciousness.   Endo/Heme/Allergies: Negative.  Does not bruise/bleed easily.   Psychiatric/Behavioral: Negative.  Negative for depression. The patient is not nervous/anxious.    All other systems reviewed and are negative.            Physical Exam  Laboratory/Imaging   Hemodynamics  Temp (24hrs), Av °C (98.6 °F), Min:36.7 °C (98 °F), Max:37.4 °C (99.4 °F)   Temperature: 37.1 °C (98.8 °F)  Pulse  Av.8  Min: 64  Max: 132 Heart Rate (Monitored): 78  Blood Pressure: 130/96 mmHg     Respiratory      Respiration: 16, Pulse Oximetry: 90 %, O2 Daily Delivery Respiratory : Room Air with O2 Available     Work Of Breathing / Effort: Mild;Shallow  RUL Breath Sounds: Clear, RML Breath Sounds: Clear, RLL Breath Sounds: Diminished, FREDO Breath Sounds: Clear, LLL Breath Sounds:  Diminished    Fluids    Intake/Output Summary (Last 24 hours) at 08/22/17 1823  Last data filed at 08/22/17 1800   Gross per 24 hour   Intake   1480 ml   Output   8975 ml   Net  -7495 ml       Nutrition  Orders Placed This Encounter   Procedures   • DIET ORDER     Standing Status: Standing      Number of Occurrences: 1      Standing Expiration Date:      Order Specific Question:  Diet:     Answer:  Full Liquid [11]     Physical Exam   Constitutional: He appears well-developed and well-nourished. No distress.   HENT:   Head: Normocephalic and atraumatic.   Nose: Nose normal.   Mouth/Throat: Oropharynx is clear and moist. No oropharyngeal exudate.   Moist lips/oral mucosa.   Eyes: Conjunctivae and EOM are normal. Pupils are equal, round, and reactive to light. Right eye exhibits no discharge. Left eye exhibits no discharge. No scleral icterus.   Neck: Normal range of motion. Neck supple. No JVD present. No tracheal deviation present. No thyromegaly present.   Cardiovascular: Normal rate, regular rhythm and normal heart sounds.  Exam reveals no gallop and no friction rub.    No murmur heard.  Pulmonary/Chest: Effort normal and breath sounds normal. No stridor. No respiratory distress. He has no wheezes. He has no rales. He exhibits no tenderness.   Slightly diminished at bases   Abdominal: Soft. Bowel sounds are normal. He exhibits no distension. There is no tenderness. There is no rebound and no guarding.   Genitourinary:   Very edematous scrotum   Musculoskeletal: Normal range of motion. He exhibits edema (2+ B/L LE and UE edema). He exhibits no tenderness.   Lymphadenopathy:     He has no cervical adenopathy.   Neurological: He is alert. No cranial nerve deficit. He exhibits normal muscle tone.   Skin: Skin is warm and dry. Rash (psoriatic rashes on UE and LE, honey crusting in left antecubital fold) noted. He is not diaphoretic. No erythema. No pallor.   Psychiatric: He has a normal mood and affect. His behavior is  normal. Thought content normal.   Nursing note and vitals reviewed.      Recent Labs      08/20/17   0245  08/21/17   0509  08/22/17   0243   WBC  23.3*  13.3*  12.6*   RBC  3.40*  3.76*  3.94*   HEMOGLOBIN  10.0*  11.3*  11.6*   HEMATOCRIT  30.7*  33.5*  34.4*   MCV  90.3  89.1  87.3   MCH  29.4  30.1  29.4   MCHC  32.6*  33.7  33.7   RDW  51.2*  49.9  47.8   PLATELETCT  175  187  217   MPV  10.0  10.5  10.2     Recent Labs      08/20/17   0245  08/21/17   0509  08/22/17   0242   SODIUM  137  141  139   POTASSIUM  3.0*  4.1  3.1*   CHLORIDE  110  113*  104   CO2  21  23  27   GLUCOSE  89  92  94   BUN  21  18  11   CREATININE  1.21  0.99  0.97   CALCIUM  7.4*  8.1*  8.3*                      Assessment/Plan     Septic shock due to CAP, immunocompromised state (CMS-HCC) (present on admission)  Assessment & Plan  - This is severe sepsis with the following associated acute organ dysfunction(s): acute kidney failure, acute respiratory failure. resolved  - doing well off pressors remained stable and getting close to discharge  - deescalate antibiotics  - continue to monitor on telemetry.     Right lower lobe pneumonia (present on admission)  Assessment & Plan  - in setting of immunocompromised state. Yeast in sputum likely colonizer, but as has thrush too and clinically improving, will continue to treat  - as lost IV access continue oral augmentin and azithromycin and diflucan. Continue to trend procalcitonin, it is improving. Follow sputum GS/culture and blood cultures.   - will need repeat chest imaging in 4-6 weeks to document resolution of infiltrates.    Immunocompromised status associated with infection (CMS-HCC) (present on admission)  Assessment & Plan  - continue to hold methotrexate for now.     Acute renal failure (ARF) due to severe sepsis/septic shock (CMS-[facility]) (present on admission)  Assessment & Plan  - resolved.   Now very edematous due to all the fluids for resuscitation, diuresing on  Lasix.      Elevated troponin level due to demand ischemia from sepsis (present on admission)  Assessment & Plan  - likely demand ischemia from sepsis. Wall abnormalities on echo likely from sepsis repeat echo ordered.  - continue ASA and lipitor.  May need to reconsult cards if still with regional dyskinesia despite resolution of sepsis.   - continue cardiac monitoring. No chest pain.    Hypovolemic hyponatremia (present on admission)  Assessment & Plan  - resolved. Due to sepsis.   Tolerating force diuresis    Hypokalemia (present on admission)  Assessment & Plan  - continue kdur 40mEq increased to 3 times a day, level still slightly low. Repeat BMP in morning    Acute respiratory failure with hypoxia due to sepsis, CAP (CMS-HCC) (present on admission)  Assessment & Plan  - continue BD per RT protocol. Now tolerating room air.  - continue antibiotics for CAP, change to oral antibiotics due to lack of IV access. Patient tolerating well pro-calcitonin and white blood cell count continued to improve    Oral thrush  Assessment & Plan  Improving continue diflucan, along with nystatin swish and swallow.     Psoriasis (present on admission)  Assessment & Plan  - continue to hold methotrexate for now. Continue topicals.  Antibiotic ointment in left antecubital area    History of tobacco abuse (present on admission)  Assessment & Plan  - Currently in remission.    Marijuana use, continuous (present on admission)  Assessment & Plan  - uses medical marijuana.    History of alcohol abuse (present on admission)  Assessment & Plan  - remains sober x 10 years now.    Gastric ulcer (present on admission)  Assessment & Plan  - continue PPI. Avoid NSAIDS.    Chronic back pain (present on admission)  Assessment & Plan  -Continue home dose MS contin, along with PRN norco.       Hypertriglyceridemia (present on admission)  Assessment & Plan  - continue lipitor.       Labs reviewed, Medications reviewed and Radiology images  reviewed  Vidales catheter: Urinary Tract Retention or Urinary Tract Obstruction      DVT Prophylaxis: Heparin    Ulcer prophylaxis: Yes  Antibiotics: Treating active infection/contamination beyond 24 hours perioperative coverage  Assessed for rehab: Patient unable to tolerate rehabilitation therapeutic regimen

## 2017-08-23 NOTE — PROGRESS NOTES
Late entry:    1400--Pt tolerating liquid diet at this time. Looking/feeling much better today. No N/V. Pt's family at bedside. MD rounded this AM, new orders for polysporin for dermatitis on left arm, increase potassium dose, ativan for n/v. Per MD, OK for pt to not have IV access as oral ABX therapy is currently working. Pt also does not want IV if not necessary. D/c plan for pt is to go home to [place] with spouse, no other needs identified at this time. Per MD, possibly remove Vidales tomorrow and possibly home Thursday/Friday if pt continues to improve clinically and symptomatically.     1500--Pt up with PT, tolerated very well (see PT notes). Pt up in cardiac chair throughout the day, getting up with SBA, no walker. Vidales draining to gravity, large amount of clear yellow urine. Pt BP remains stable post lasix administration BID. Pt provided with heat packs for back pain, legs and scrotum elevated to help with swelling.    1700--No changes in pt status. No needs/complaints at this time. Pt calls appropriately, will CTM.    Tele strip at 0757 shows SR w/ HR of 70  Measurements: .16/.10/.40    Tele Shift Summary:  Rhythm: SR  Rate: 70's-80's  Ectopy: Per CCT [place], pt had rare PVC.    Telemetry monitoring strips placed in pt chart.

## 2017-08-23 NOTE — PROGRESS NOTES
Bedside report given to Cortez CLAYTON. POC discussed. Pt resting comfortably in bed. Safety precautions in place. Family at bedside. No needs/complaints at this time. Pt tolerated dinner, no n/v.

## 2017-08-24 VITALS
HEIGHT: 68 IN | DIASTOLIC BLOOD PRESSURE: 98 MMHG | TEMPERATURE: 98 F | SYSTOLIC BLOOD PRESSURE: 147 MMHG | BODY MASS INDEX: 32.74 KG/M2 | OXYGEN SATURATION: 97 % | WEIGHT: 216.05 LBS | HEART RATE: 83 BPM | RESPIRATION RATE: 18 BRPM

## 2017-08-24 LAB
ANION GAP SERPL CALC-SCNC: 8 MMOL/L (ref 0–11.9)
BUN SERPL-MCNC: 10 MG/DL (ref 8–22)
CALCIUM SERPL-MCNC: 8.8 MG/DL (ref 8.4–10.2)
CHLORIDE SERPL-SCNC: 98 MMOL/L (ref 96–112)
CO2 SERPL-SCNC: 29 MMOL/L (ref 20–33)
CREAT SERPL-MCNC: 1.04 MG/DL (ref 0.5–1.4)
ERYTHROCYTE [DISTWIDTH] IN BLOOD BY AUTOMATED COUNT: 45.4 FL (ref 35.9–50)
GFR SERPL CREATININE-BSD FRML MDRD: >60 ML/MIN/1.73 M 2
GLUCOSE SERPL-MCNC: 100 MG/DL (ref 65–99)
HCT VFR BLD AUTO: 40 % (ref 42–52)
HGB BLD-MCNC: 13.6 G/DL (ref 14–18)
MCH RBC QN AUTO: 30.1 PG (ref 27–33)
MCHC RBC AUTO-ENTMCNC: 34 G/DL (ref 33.7–35.3)
MCV RBC AUTO: 88.5 FL (ref 81.4–97.8)
PLATELET # BLD AUTO: 387 K/UL (ref 164–446)
PMV BLD AUTO: 9.6 FL (ref 9–12.9)
POTASSIUM SERPL-SCNC: 3.8 MMOL/L (ref 3.6–5.5)
PROCALCITONIN SERPL-MCNC: 3.64 NG/ML
RBC # BLD AUTO: 4.52 M/UL (ref 4.7–6.1)
SODIUM SERPL-SCNC: 135 MMOL/L (ref 135–145)
WBC # BLD AUTO: 13.8 K/UL (ref 4.8–10.8)

## 2017-08-24 PROCEDURE — 99239 HOSP IP/OBS DSCHRG MGMT >30: CPT | Performed by: HOSPITALIST

## 2017-08-24 PROCEDURE — A9270 NON-COVERED ITEM OR SERVICE: HCPCS | Performed by: HOSPITALIST

## 2017-08-24 PROCEDURE — 700102 HCHG RX REV CODE 250 W/ 637 OVERRIDE(OP): Performed by: HOSPITALIST

## 2017-08-24 PROCEDURE — 80048 BASIC METABOLIC PNL TOTAL CA: CPT

## 2017-08-24 PROCEDURE — 700102 HCHG RX REV CODE 250 W/ 637 OVERRIDE(OP): Performed by: INTERNAL MEDICINE

## 2017-08-24 PROCEDURE — A9270 NON-COVERED ITEM OR SERVICE: HCPCS | Performed by: INTERNAL MEDICINE

## 2017-08-24 PROCEDURE — 84145 PROCALCITONIN (PCT): CPT

## 2017-08-24 PROCEDURE — 85027 COMPLETE CBC AUTOMATED: CPT

## 2017-08-24 RX ORDER — FLUCONAZOLE 200 MG/1
200 TABLET ORAL DAILY
Qty: 5 TAB | Refills: 0 | Status: SHIPPED | OUTPATIENT
Start: 2017-08-24 | End: 2017-08-29

## 2017-08-24 RX ORDER — AMOXICILLIN AND CLAVULANATE POTASSIUM 875; 125 MG/1; MG/1
1 TABLET, FILM COATED ORAL EVERY 12 HOURS
Qty: 20 TAB | Refills: 0 | Status: SHIPPED | OUTPATIENT
Start: 2017-08-24 | End: 2017-09-03

## 2017-08-24 RX ADMIN — AMOXICILLIN AND CLAVULANATE POTASSIUM 1 TABLET: 875; 125 TABLET, FILM COATED ORAL at 08:58

## 2017-08-24 RX ADMIN — FUROSEMIDE 40 MG: 40 TABLET ORAL at 06:08

## 2017-08-24 RX ADMIN — HYDROCODONE BITARTRATE AND ACETAMINOPHEN 2 TABLET: 10; 325 TABLET ORAL at 00:59

## 2017-08-24 RX ADMIN — POTASSIUM CHLORIDE 40 MEQ: 1500 TABLET, EXTENDED RELEASE ORAL at 08:57

## 2017-08-24 RX ADMIN — AZITHROMYCIN 500 MG: 250 TABLET, FILM COATED ORAL at 08:58

## 2017-08-24 RX ADMIN — FLUCONAZOLE 200 MG: 200 TABLET ORAL at 08:58

## 2017-08-24 RX ADMIN — MORPHINE SULFATE 30 MG: 30 TABLET, EXTENDED RELEASE ORAL at 08:58

## 2017-08-24 RX ADMIN — LACTOBACILLUS ACIDOPHILUS / LACTOBACILLUS BULGARICUS 1 PACKET: 100 MILLION CFU STRENGTH GRANULES at 08:56

## 2017-08-24 RX ADMIN — LACTOBACILLUS ACIDOPHILUS / LACTOBACILLUS BULGARICUS 1 PACKET: 100 MILLION CFU STRENGTH GRANULES at 11:38

## 2017-08-24 RX ADMIN — ASPIRIN 325 MG ORAL TABLET 325 MG: 325 PILL ORAL at 08:58

## 2017-08-24 RX ADMIN — FOLIC ACID 1 MG: 1 TABLET ORAL at 09:00

## 2017-08-24 RX ADMIN — OMEPRAZOLE 20 MG: 20 CAPSULE, DELAYED RELEASE ORAL at 08:57

## 2017-08-24 RX ADMIN — BACITRACIN ZINC AND POLYMYXIN B SULFATE: 500; 10000 OINTMENT TOPICAL at 08:59

## 2017-08-24 ASSESSMENT — PAIN SCALES - GENERAL
PAINLEVEL_OUTOF10: 10
PAINLEVEL_OUTOF10: 7

## 2017-08-24 NOTE — PROGRESS NOTES
[facility] Hospitalist Progress Note    Date of Service: 2017    Chief Complaint  46/M with psoriasis on MTX, GERD, severe sepsis due to pneumonia, PURA.        Interval Problem Update  Clinically improving edema better up ambulating on room air.  Skin lesions improving on left antecubital area    Consultants/Specialty  Cardiology    Disposition  Will go home to [place] with his wife.        Review of Systems   Constitutional: Negative for fever, chills, weight loss and malaise/fatigue.   HENT: Negative.    Respiratory: Positive for cough. Negative for shortness of breath.    Cardiovascular: Positive for leg swelling. Negative for chest pain.   Gastrointestinal: Negative.  Negative for nausea, vomiting and abdominal pain.   Genitourinary: Negative.  Negative for dysuria and flank pain.   Musculoskeletal: Negative.  Negative for myalgias and back pain.   Neurological: Positive for weakness. Negative for dizziness and loss of consciousness.   Endo/Heme/Allergies: Negative.  Does not bruise/bleed easily.   Psychiatric/Behavioral: Negative.  Negative for depression. The patient is not nervous/anxious.    All other systems reviewed and are negative.            Physical Exam  Laboratory/Imaging   Hemodynamics  Temp (24hrs), Av.1 °C (98.7 °F), Min:36.8 °C (98.2 °F), Max:37.6 °C (99.6 °F)   Temperature: 36.9 °C (98.4 °F)  Pulse  Av.3  Min: 64  Max: 132    Blood Pressure: 134/95 mmHg     Respiratory      Respiration: 18, Pulse Oximetry: 90 %     Work Of Breathing / Effort: Mild  RUL Breath Sounds: Clear, RML Breath Sounds: Clear, RLL Breath Sounds: Diminished, FREDO Breath Sounds: Clear, LLL Breath Sounds: Diminished    Fluids    Intake/Output Summary (Last 24 hours) at 17 1708  Last data filed at 17 0300   Gross per 24 hour   Intake    240 ml   Output   3800 ml   Net  -3560 ml       Nutrition  Orders Placed This Encounter   Procedures   • DIET ORDER     Standing Status: Standing      Number of  Occurrences: 1      Standing Expiration Date:      Order Specific Question:  Diet:     Answer:  Regular [1]     Physical Exam   Constitutional: He appears well-developed and well-nourished. No distress.   HENT:   Head: Normocephalic and atraumatic.   Nose: Nose normal.   Mouth/Throat: Oropharynx is clear and moist. No oropharyngeal exudate.   Moist lips/oral mucosa.   Eyes: Conjunctivae and EOM are normal. Pupils are equal, round, and reactive to light. Right eye exhibits no discharge. Left eye exhibits no discharge. No scleral icterus.   Neck: Normal range of motion. Neck supple. No JVD present. No tracheal deviation present. No thyromegaly present.   Cardiovascular: Normal rate, regular rhythm and normal heart sounds.  Exam reveals no gallop and no friction rub.    No murmur heard.  Pulmonary/Chest: Effort normal and breath sounds normal. No stridor. No respiratory distress. He has no wheezes. He has no rales. He exhibits no tenderness.   Slightly diminished at bases   Abdominal: Soft. Bowel sounds are normal. He exhibits no distension. There is no tenderness. There is no rebound and no guarding.   Genitourinary:   Scrotal edema improved   Musculoskeletal: He exhibits edema (1+ B/L LE and UE edema). He exhibits no tenderness.   Lymphadenopathy:     He has no cervical adenopathy.   Neurological: He is alert. No cranial nerve deficit. He exhibits normal muscle tone.   Skin: Skin is warm and dry. Rash (psoriatic rashes on UE and LE, honey crusting in left antecubital fold improved) noted. He is not diaphoretic. No erythema. No pallor.   Psychiatric: He has a normal mood and affect. His behavior is normal. Thought content normal.   Nursing note and vitals reviewed.      Recent Labs      08/21/17   0509  08/22/17   0243  08/23/17   0454   WBC  13.3*  12.6*  12.7*   RBC  3.76*  3.94*  4.22*   HEMOGLOBIN  11.3*  11.6*  12.9*   HEMATOCRIT  33.5*  34.4*  36.9*   MCV  89.1  87.3  87.4   MCH  30.1  29.4  30.6   MCHC  33.7   33.7  35.0   RDW  49.9  47.8  45.1   PLATELETCT  187  217  289   MPV  10.5  10.2  10.2     Recent Labs      08/21/17   0509  08/22/17   0242  08/23/17   0454   SODIUM  141  139  139   POTASSIUM  4.1  3.1*  2.9*   CHLORIDE  113*  104  101   CO2  23  27  30   GLUCOSE  92  94  106*   BUN  18  11  11   CREATININE  0.99  0.97  0.88   CALCIUM  8.1*  8.3*  8.6                      Assessment/Plan     Septic shock due to CAP, immunocompromised state (CMS-HCC) (present on admission)  Assessment & Plan  - This is severe sepsis with the following associated acute organ dysfunction(s): acute kidney failure, acute respiratory failure. resolved  - doing well off pressors remained stable and getting close to discharge  - deescalated antibiotics 8/22  - continue to monitor on telemetry.     Right lower lobe pneumonia (present on admission)  Assessment & Plan  - in setting of immunocompromised state. Yeast in sputum likely colonizer, but as has thrush too and clinically improving  Tolerating oral augmenting and azithromycin  Anticipate discharge tomorrow    Immunocompromised status associated with infection (CMS-HCC) (present on admission)  Assessment & Plan  - continue to hold methotrexate for now.     Acute renal failure (ARF) due to severe sepsis/septic shock (CMS-HCC) (present on admission)  Assessment & Plan  - resolved.   Peripheral edema improving with oral lasix      Elevated troponin level due to demand ischemia from sepsis (present on admission)  Assessment & Plan   demand ischemia from sepsis. Wall abnormalities on echo early in admit have improved and nearly resolved, EF went from 45 to 55%  Edema resolving, no chest pain.    Hypovolemic hyponatremia (present on admission)  Assessment & Plan  - resolved. Due to sepsis.   Tolerating force diuresis    Hypokalemia (present on admission)  Assessment & Plan  - continue kdur 40mEq increased to 3 times a day, level low again due to diuresis; increase to 80 meq and recheck bmp  tomorrow    Acute respiratory failure with hypoxia due to sepsis, CAP (CMS-HCC) (present on admission)  Assessment & Plan  - continue BD per RT protocol. Now tolerating room air.  Continues to improved clinically, tolerating oral antbiotics      Oral thrush  Assessment & Plan  Improving continue diflucan, along with nystatin swish and swallow.     Psoriasis (present on admission)  Assessment & Plan  - continue to hold methotrexate for now. Continue topicals.  Antibiotic ointment in left antecubital area    History of tobacco abuse (present on admission)  Assessment & Plan  - Currently in remission.    Marijuana use, continuous (present on admission)  Assessment & Plan  - uses medical marijuana.    History of alcohol abuse (present on admission)  Assessment & Plan  - remains sober x 10 years now.    Gastric ulcer (present on admission)  Assessment & Plan  - continue PPI. Avoid NSAIDS.    Chronic back pain (present on admission)  Assessment & Plan  -Continue home dose MS contin, along with PRN norco.       Hypertriglyceridemia (present on admission)  Assessment & Plan  - continue lipitor.       Labs reviewed, Medications reviewed and Radiology images reviewed  Angulo catheter: No Angulo      DVT Prophylaxis: Heparin    Ulcer prophylaxis: Yes  Antibiotics: Treating active infection/contamination beyond 24 hours perioperative coverage  Assessed for rehab: Patient unable to tolerate rehabilitation therapeutic regimen     Remove angulo catheter 8/23

## 2017-08-24 NOTE — CARE PLAN
Problem: Communication  Goal: The ability to communicate needs accurately and effectively will improve  Outcome: PROGRESSING AS EXPECTED  Oriented patient to the call light in order to alert staff of his needs. Educated patient about the plan of care, procedures, treatments, medications, and allowing for patient questions and answering them appropriately    Problem: Infection  Goal: Will remain free from infection  Outcome: PROGRESSING AS EXPECTED  Administering antiinfective (PO Augmentin and Zithromax) as ordered and assessing for signs and symptoms of improvement    Problem: Pain Management  Goal: Pain level will decrease to patient’s comfort goal  Outcome: PROGRESSING AS EXPECTED  Assessing patient's pain, patient encouraged to verbalize experiencing pain. 0-10 pain scale explained, verbalized understanding. Administer pain meds as ordered.

## 2017-08-24 NOTE — PROGRESS NOTES
Tele strip at 1846 shows Sinus rhythm with a HR of 88.      Measurements: .14/.08/.34    Tele Shift Summary:    Rhythm : Sinus rhythm/tachycardia  Rate : 60s to low 100s    Ectopy : Per CCT Lizandro pt had no ectopy    Telemetry monitoring strips placed in pt chart.

## 2017-08-24 NOTE — CARE PLAN
Problem: Knowledge Deficit  Goal: Knowledge of disease process/condition, treatment plan, diagnostic tests, and medications will improve  Outcome: PROGRESSING AS EXPECTED  Pt updated on POC. All questions answered. Pt verbalized understanding.

## 2017-08-24 NOTE — PROGRESS NOTES
0657 Bedside report received from night shift RNGail. Pt resting comfortably in bed. No complaints at this time.

## 2017-08-24 NOTE — DISCHARGE INSTRUCTIONS
Discharge Instructions    Discharged to home by car with relative. Discharged via wheelchair, hospital escort: Yes.  Special equipment needed: Not Applicable    Be sure to schedule a follow-up appointment with your primary care doctor or any specialists as instructed.     Discharge Plan:        I understand that a diet low in cholesterol, fat, and sodium is recommended for good health. Unless I have been given specific instructions below for another diet, I accept this instruction as my diet prescription.   Other diet: as tolerated.    Special Instructions: Follow up with PCP.    · Is patient discharged on Warfarin / Coumadin?   No     · Is patient Post Blood Transfusion?  No    Depression / Suicide Risk    As you are discharged from this [facility] facility, it is important to learn how to keep safe from harming yourself.    Recognize the warning signs:  · Abrupt changes in personality, positive or negative- including increase in energy   · Giving away possessions  · Change in eating patterns- significant weight changes-  positive or negative  · Change in sleeping patterns- unable to sleep or sleeping all the time   · Unwillingness or inability to communicate  · Depression  · Unusual sadness, discouragement and loneliness  · Talk of wanting to die  · Neglect of personal appearance   · Rebelliousness- reckless behavior  · Withdrawal from people/activities they love  · Confusion- inability to concentrate     If you or a loved one observes any of these behaviors or has concerns about self-harm, here's what you can do:  · Talk about it- your feelings and reasons for harming yourself  · Remove any means that you might use to hurt yourself (examples: pills, rope, extension cords, firearm)  · Get professional help from the community (Mental Health, Substance Abuse, psychological counseling)  · Do not be alone:Call your Safe Contact- someone whom you trust who will be there for you.  · Call your local CRISIS HOTLINE  735-6415 or 203-821-0150  · Call your local Children's Mobile Crisis Response Team [place] (619) 593-3537 or www.[employer]  · Call the toll free National Suicide Prevention Hotlines   · National Suicide Prevention Lifeline 274-180-QCDX (0995)  · National Hope Line Network 800-SUICIDE (489-5206)      Sepsis, Adult  Sepsis is a serious infection of your blood or tissues that affects your whole body. The infection that causes sepsis may be bacterial, viral, fungal, or parasitic. Sepsis may be life threatening. Sepsis can cause your blood pressure to drop. This may result in shock. Shock causes your central nervous system and your organs to stop working correctly.   RISK FACTORS  Sepsis can happen in anyone, but it is more likely to happen in people who have weakened immune systems.  SIGNS AND SYMPTOMS   Symptoms of sepsis can include:  · Fever or low body temperature (hypothermia).  · Rapid breathing (hyperventilation).  · Chills.  · Rapid heartbeat (tachycardia).  · Confusion or light-headedness.  · Trouble breathing.  · Urinating much less than usual.  · Cool, clammy skin or red, flushed skin.  · Other problems with the heart, kidneys, or brain.  DIAGNOSIS   Your health care provider will likely do tests to look for an infection, to see if the infection has spread to your blood, and to see how serious your condition is. Tests can include:  · Blood tests, including cultures of your blood.  · Cultures of other fluids from your body, such as:  ¨ Urine.  ¨ Pus from wounds.  ¨ Mucus coughed up from your lungs.  · Urine tests other than cultures.  · X-ray exams or other imaging tests.  TREATMENT   Treatment will begin with elimination of the source of infection. If your sepsis is likely caused by a bacterial or fungal infection, you will be given antibiotic or antifungal medicines.  You may also receive:  · Oxygen.  · Fluids through an IV tube.  · Medicines to increase your blood pressure.  · A machine to  clean your blood (dialysis) if your kidneys fail.  · A machine to help you breathe if your lungs fail.  SEEK IMMEDIATE MEDICAL CARE IF:  You get an infection or develop any of the signs and symptoms of sepsis after surgery or a hospitalization.     This information is not intended to replace advice given to you by your health care provider. Make sure you discuss any questions you have with your health care provider.     Document Released: 09/15/2004 Document Revised: 05/03/2016 Document Reviewed: 08/25/2014  [employer] Patient Education ©2016 [employer]vier Inc.    Amoxicillin; Clavulanic Acid tablets  What is this medicine?  AMOXICILLIN; CLAVULANIC ACID (a mox i GERTRUDE in; SANDRO mosley ic AS id) is a penicillin antibiotic. It is used to treat certain kinds of bacterial infections. It will not work for colds, flu, or other viral infections.  This medicine may be used for other purposes; ask your health care provider or pharmacist if you have questions.  COMMON BRAND NAME(S): Augmentin  What should I tell my health care provider before I take this medicine?  They need to know if you have any of these conditions:  -bowel disease, like colitis  -kidney disease  -liver disease  -mononucleosis  -an unusual or allergic reaction to amoxicillin, penicillin, cephalosporin, other antibiotics, clavulanic acid, other medicines, foods, dyes, or preservatives  -pregnant or trying to get pregnant  -breast-feeding  How should I use this medicine?  Take this medicine by mouth with a full glass of water. Follow the directions on the prescription label. Take at the start of a meal. Do not crush or chew. If the tablet has a score line, you may cut it in half at the score line for easier swallowing. Take your medicine at regular intervals. Do not take your medicine more often than directed. Take all of your medicine as directed even if you think you are better. Do not skip doses or stop your medicine early.  Talk to your pediatrician  regarding the use of this medicine in children. Special care may be needed.  Overdosage: If you think you have taken too much of this medicine contact a poison control center or emergency room at once.  NOTE: This medicine is only for you. Do not share this medicine with others.  What if I miss a dose?  If you miss a dose, take it as soon as you can. If it is almost time for your next dose, take only that dose. Do not take double or extra doses.  What may interact with this medicine?  -allopurinol  -anticoagulants  -birth control pills  -methotrexate  -probenecid  This list may not describe all possible interactions. Give your health care provider a list of all the medicines, herbs, non-prescription drugs, or dietary supplements you use. Also tell them if you smoke, drink alcohol, or use illegal drugs. Some items may interact with your medicine.  What should I watch for while using this medicine?  Tell your doctor or health care professional if your symptoms do not improve.  Do not treat diarrhea with over the counter products. Contact your doctor if you have diarrhea that lasts more than 2 days or if it is severe and watery.  If you have diabetes, you may get a false-positive result for sugar in your urine. Check with your doctor or health care professional.  Birth control pills may not work properly while you are taking this medicine. Talk to your doctor about using an extra method of birth control.  What side effects may I notice from receiving this medicine?  Side effects that you should report to your doctor or health care professional as soon as possible:  -allergic reactions like skin rash, itching or hives, swelling of the face, lips, or tongue  -breathing problems  -dark urine  -fever or chills, sore throat  -redness, blistering, peeling or loosening of the skin, including inside the mouth  -seizures  -trouble passing urine or change in the amount of urine  -unusual bleeding, bruising  -unusually weak or  tired  -white patches or sores in the mouth or throat  Side effects that usually do not require medical attention (report to your doctor or health care professional if they continue or are bothersome):  -diarrhea  -dizziness  -headache  -nausea, vomiting  -stomach upset  -vaginal or anal irritation  This list may not describe all possible side effects. Call your doctor for medical advice about side effects. You may report side effects to FDA at 9-226-RVP-7989.  Where should I keep my medicine?  Keep out of the reach of children.  Store at room temperature below 25 degrees C (77 degrees F). Keep container tightly closed. Throw away any unused medicine after the expiration date.  NOTE: This sheet is a summary. It may not cover all possible information. If you have questions about this medicine, talk to your doctor, pharmacist, or health care provider.  © 2014, Elsevier/Gold Standard. (3/12/2009 12:04:30 PM)    Fluconazole tablets  What is this medicine?  FLUCONAZOLE (floo SHIRAZ na zole) is an antifungal medicine. It is used to treat certain kinds of fungal or yeast infections.  This medicine may be used for other purposes; ask your health care provider or pharmacist if you have questions.  COMMON BRAND NAME(S): Diflucan  What should I tell my health care provider before I take this medicine?  They need to know if you have any of these conditions:  -electrolyte abnormalities  -history of irregular heart beat  -kidney disease  -an unusual or allergic reaction to fluconazole, other azole antifungals, medicines, foods, dyes, or preservatives  -pregnant or trying to get pregnant  -breast-feeding  How should I use this medicine?  Take this medicine by mouth. Follow the directions on the prescription label. Do not take your medicine more often than directed.  Talk to your pediatrician regarding the use of this medicine in children. Special care may be needed. This medicine has been used in children as young as 6 months of  age.  Overdosage: If you think you have taken too much of this medicine contact a poison control center or emergency room at once.  NOTE: This medicine is only for you. Do not share this medicine with others.  What if I miss a dose?  If you miss a dose, take it as soon as you can. If it is almost time for your next dose, take only that dose. Do not take double or extra doses.  What may interact with this medicine?  Do not take this medicine with any of the following medications:  -cisapride  -pimozide  -red yeast rice  This medicine may also interact with the following medications:  -birth control pills  -cyclosporine  -diuretics like hydrochlorothiazide  -medicines for diabetes that are taken by mouth  -medicines for high cholesterol like atorvastatin, lovastatin or simvastatin  -phenytoin  -ramelteon  -rifabutin  -rifampin  -some medicines for anxiety or sleep  -tacrolimus  -terfenadine  -theophylline  -tofacitinib  -warfarin  This list may not describe all possible interactions. Give your health care provider a list of all the medicines, herbs, non-prescription drugs, or dietary supplements you use. Also tell them if you smoke, drink alcohol, or use illegal drugs. Some items may interact with your medicine.  What should I watch for while using this medicine?  Visit your doctor or health care professional for regular checkups. If you are taking this medicine for a long time you may need blood work. Tell your doctor if your symptoms do not improve. Some fungal infections need many weeks or months of treatment to cure.  Alcohol can increase possible damage to your liver. Avoid alcoholic drinks.  If you have a vaginal infection, do not have sex until you have finished your treatment. You can wear a sanitary napkin. Do not use tampons. Wear freshly washed cotton, not synthetic, panties.  What side effects may I notice from receiving this medicine?  Side effects that you should report to your doctor or health care  professional as soon as possible:  -allergic reactions like skin rash or itching, hives, swelling of the lips, mouth, tongue, or throat  -dark urine  -feeling dizzy or faint  -irregular heartbeat or chest pain  -redness, blistering, peeling or loosening of the skin, including inside the mouth  -trouble breathing  -unusual bruising or bleeding  -vomiting  -yellowing of the eyes or skin  Side effects that usually do not require medical attention (report to your doctor or health care professional if they continue or are bothersome):  -changes in how food tastes  -diarrhea  -headache  -stomach upset or nausea  This list may not describe all possible side effects. Call your doctor for medical advice about side effects. You may report side effects to FDA at 1-954-FDA-5944.  Where should I keep my medicine?  Keep out of the reach of children.  Store at room temperature below 30 degrees C (86 degrees F). Throw away any medicine after the expiration date.  NOTE: This sheet is a summary. It may not cover all possible information. If you have questions about this medicine, talk to your doctor, pharmacist, or health care provider.  © 2014, Elsevier/Gold Standard. (9/17/2013 3:15:11 PM)

## 2017-08-24 NOTE — CARE PLAN
Problem: Urinary Elimination:  Goal: Ability to reestablish a normal urinary elimination pattern will improve  Outcome: PROGRESSING AS EXPECTED  Vidales catheter discontinued, patient is voiding with no pain or problem.     Problem: Mobility  Goal: Risk for activity intolerance will decrease  Outcome: PROGRESSING AS EXPECTED  Patient walking steadily in the halls with wife. He is tolerating it well.

## 2017-08-24 NOTE — PROGRESS NOTES
1316 Pt discharged via wheelchair accompanied by one female RN. Pt discharged to home via private car with personal belongings, discharge instructions, and written prescriptions.

## 2017-08-24 NOTE — CARE PLAN
Problem: Safety  Goal: Will remain free from falls  Outcome: PROGRESSING AS EXPECTED  Treaded socks on pt. Bed in lowest position. Call light and personal belongings within reach. Pt educated to call when in need of assistance.

## 2017-08-24 NOTE — PROGRESS NOTES
1900: Report received from Samantha CLAYTON, patient laying comfortably in bed and denies any needs or complaints at this time, safety precautions remain in place, CLIP    2015: Assessment performed, patient is A&O x 4 at this time, patient complaining of 8/10 lower back pain, scheduled pain medication administered per MAR as well as remaining due medications, patient no longer has symptoms of thrush and is refusing nystatin at this time. Patient denies any other needs or complaints at this time, safety precautions remain in place, CLIP    2330: Patient resting on and off in bed with no s/s of distress noted, respirations even and unlabored, safety precautions in place    0100: Patient complaining of 10/10 lower back and neck pain, prn pain medication administered per MAR    0400: Patient resting on and off in bed, respirations even and unlabored, no s/s of distress noted, safety precautions in place    0700: Report given to Dionisio CLAYTON, patient laying comfortably in bed and denies any needs or complaints at this time, safety precautions in place, CLIP

## 2017-08-25 NOTE — DISCHARGE SUMMARY
CHIEF COMPLAINT ON ADMISSION  No chief complaint on file.      CODE STATUS  Full code.    HPI & HOSPITAL COURSE  46/M with psoriasis on MTX, GERD, admitted for ALOC, with ongoing N/V, with fevers and chills, along with cough. Noted to be hypotensive at an outside facility, minimally improved with IVF and started on levophed and transferred to [facility]. WBC 34.8. Lactate 6.8. Na 133. K 3.4. CXR showed dense right upper lobe opacity. Felt to have pneumonia. Started on IVF, antibiotics (zosyn, vancomycin), and pressors. Chest CT showed right upper lobe airspace opacity. CXR today showed unchanged RUL consolidation (personally reviewed). PCT 73.39. Blood cultures negative. C diff negative. MRSA ICS negative. Pending sputum GS/culture. Echo showed EF 45%, dyskinetic inferior and septal walls, grade I diastolic dysfunction, right ventricle was normal in size and function, with no significant valve disease or flow abnormalities. Successfully taken off pressors and transferred out of the ICU. Repeat echocardiogram showed normal wall motion abnormality and EF 55%. He had residual edema from the resuscitation which improved with lasix, angulo catheter removed prior to discharge and urinating normally. The patient is not going to resume taking methotrexate after discharge until he discusses this with his rheumatologist in about four weeks. He completed antibiotics IV and is discharged home on augmentin and diflucan for thrush.    Therefore, he is discharged in good and stable condition with close outpatient follow-up.    SPECIFIC OUTPATIENT FOLLOW-UP  Primary care  Rheumatology    DISCHARGE PROBLEM LIST  Active Problems:    Septic shock due to CAP, immunocompromised state (CMS-[facility]) POA: Yes    Right lower lobe pneumonia POA: Yes    Immunocompromised status associated with infection (CMS-[facility]) POA: Yes    Acute renal failure (ARF) due to severe sepsis/septic shock (CMS-[facility]) POA: Yes    Elevated troponin level due to demand ischemia  from sepsis POA: Yes    Hypovolemic hyponatremia POA: Yes    Hypokalemia POA: Yes    Acute respiratory failure with hypoxia due to sepsis, CAP (CMS-[facility]) POA: Yes    Oral thrush POA: No    Psoriasis POA: Yes    History of tobacco abuse POA: Yes    Marijuana use, continuous POA: Yes    History of alcohol abuse POA: Yes    Gastric ulcer POA: Yes    Chronic back pain POA: Yes    Hypertriglyceridemia POA: Yes  Resolved Problems:    * No resolved hospital problems. *      FOLLOW UP  No future appointments.  Pcp Pt States None            MEDICATIONS ON DISCHARGE  amoxicillin-clavulanate (AUGMENTIN) 875-125 MG Tab  Take 1 Tab by mouth every 12 hours for 10 days.             fluconazole (DIFLUCAN) 200 MG Tab  Take 1 Tab by mouth every day for 5 days.             folic acid (FOLVITE) 1 MG Tab  Take 1 mg by mouth every day.             hydrocodone/acetaminophen (NORCO)  MG Tab  Take 1-2 Tabs by mouth every 6 hours as needed for Moderate Pain.             methotrexate 2.5 MG Tab  Take 15 mg by mouth every 7 days.  Held until seen by rheumatology.             morphine ER (MS CONTIN) 15 MG Tab CR tablet  Take 30 mg by mouth every 12 hours.             naproxen (NAPROSYN) 500 MG Tab  Take 500 mg by mouth 2 times a day, with meals.             omeprazole (PRILOSEC) 20 MG delayed-release capsule  Take 20 mg by mouth every day.             zolpidem (AMBIEN) 5 MG Tab  Take 5 mg by mouth at bedtime as needed for Sleep.                 DIET  Regular diet.    ACTIVITY  As tolerated.  Weight bearing as tolerated      CONSULTATIONS  Cardiology, Dr. Card    PROCEDURES  None.    LABORATORY  Lab Results   Component Value Date/Time    SODIUM 135 08/24/2017 05:01 AM    POTASSIUM 3.8 08/24/2017 05:01 AM    CHLORIDE 98 08/24/2017 05:01 AM    CO2 29 08/24/2017 05:01 AM    GLUCOSE 100* 08/24/2017 05:01 AM    BUN 10 08/24/2017 05:01 AM    CREATININE 1.04 08/24/2017 05:01 AM        Lab Results   Component Value Date/Time    WBC 13.8*  08/24/2017 05:01 AM    HEMOGLOBIN 13.6* 08/24/2017 05:01 AM    HEMATOCRIT 40.0* 08/24/2017 05:01 AM    PLATELET COUNT 387 08/24/2017 05:01 AM        Total time of the discharge process exceeds 40 minutes

## 2017-10-26 NOTE — ADDENDUM NOTE
Encounter addended by: Fozia Quiñones R.N. on: 10/26/2017  3:01 PM<BR>    Actions taken: Bed Days saved

## 2025-06-01 NOTE — THERAPY
Occupational Therapy-  Attempted OT eval, pt in process of being transferred out of ICU to another room.  Will monitor and see as able when settled.   No